# Patient Record
Sex: MALE | Race: WHITE | NOT HISPANIC OR LATINO | Employment: FULL TIME | URBAN - METROPOLITAN AREA
[De-identification: names, ages, dates, MRNs, and addresses within clinical notes are randomized per-mention and may not be internally consistent; named-entity substitution may affect disease eponyms.]

---

## 2017-04-18 ENCOUNTER — OFFICE VISIT (OUTPATIENT)
Dept: GASTROENTEROLOGY | Facility: CLINIC | Age: 52
End: 2017-04-18
Payer: COMMERCIAL

## 2017-04-18 ENCOUNTER — TELEPHONE (OUTPATIENT)
Dept: ENDOSCOPY | Facility: HOSPITAL | Age: 52
End: 2017-04-18

## 2017-04-18 VITALS
WEIGHT: 235 LBS | BODY MASS INDEX: 32.9 KG/M2 | DIASTOLIC BLOOD PRESSURE: 78 MMHG | SYSTOLIC BLOOD PRESSURE: 122 MMHG | HEIGHT: 71 IN | HEART RATE: 65 BPM

## 2017-04-18 DIAGNOSIS — Z80.0 FAMILY HISTORY OF COLON CANCER: Primary | ICD-10-CM

## 2017-04-18 DIAGNOSIS — Z12.11 SCREENING FOR COLON CANCER: ICD-10-CM

## 2017-04-18 DIAGNOSIS — Z12.11 SPECIAL SCREENING FOR MALIGNANT NEOPLASMS, COLON: Primary | ICD-10-CM

## 2017-04-18 PROCEDURE — 1160F RVW MEDS BY RX/DR IN RCRD: CPT | Mod: S$GLB,,, | Performed by: INTERNAL MEDICINE

## 2017-04-18 PROCEDURE — 99999 PR PBB SHADOW E&M-EST. PATIENT-LVL III: CPT | Mod: PBBFAC,,, | Performed by: INTERNAL MEDICINE

## 2017-04-18 PROCEDURE — 99212 OFFICE O/P EST SF 10 MIN: CPT | Mod: S$GLB,,, | Performed by: INTERNAL MEDICINE

## 2017-04-18 RX ORDER — SODIUM, POTASSIUM,MAG SULFATES 17.5-3.13G
1 SOLUTION, RECONSTITUTED, ORAL ORAL ONCE
Qty: 1 BOTTLE | Refills: 0 | Status: SHIPPED | OUTPATIENT
Start: 2017-04-18 | End: 2017-04-18

## 2017-04-26 DIAGNOSIS — Z00.00 ROUTINE GENERAL MEDICAL EXAMINATION AT A HEALTH CARE FACILITY: Primary | ICD-10-CM

## 2017-05-24 ENCOUNTER — ANESTHESIA (OUTPATIENT)
Dept: ENDOSCOPY | Facility: HOSPITAL | Age: 52
End: 2017-05-24
Payer: COMMERCIAL

## 2017-05-24 ENCOUNTER — ANESTHESIA EVENT (OUTPATIENT)
Dept: ENDOSCOPY | Facility: HOSPITAL | Age: 52
End: 2017-05-24
Payer: COMMERCIAL

## 2017-05-24 ENCOUNTER — HOSPITAL ENCOUNTER (OUTPATIENT)
Facility: HOSPITAL | Age: 52
Discharge: HOME | End: 2017-05-24
Attending: INTERNAL MEDICINE | Admitting: INTERNAL MEDICINE
Payer: COMMERCIAL

## 2017-05-24 VITALS
DIASTOLIC BLOOD PRESSURE: 69 MMHG | OXYGEN SATURATION: 95 % | TEMPERATURE: 97 F | BODY MASS INDEX: 33.6 KG/M2 | HEIGHT: 71 IN | SYSTOLIC BLOOD PRESSURE: 121 MMHG | HEART RATE: 67 BPM | RESPIRATION RATE: 16 BRPM | WEIGHT: 240 LBS

## 2017-05-24 VITALS — RESPIRATION RATE: 15 BRPM

## 2017-05-24 DIAGNOSIS — Z12.11 ENCOUNTER FOR SCREENING COLONOSCOPY: ICD-10-CM

## 2017-05-24 PROCEDURE — 37000009 HC ANESTHESIA EA ADD 15 MINS: Performed by: INTERNAL MEDICINE

## 2017-05-24 PROCEDURE — 45380 COLONOSCOPY AND BIOPSY: CPT | Performed by: INTERNAL MEDICINE

## 2017-05-24 PROCEDURE — 88305 TISSUE EXAM BY PATHOLOGIST: CPT | Mod: 26,,, | Performed by: PATHOLOGY

## 2017-05-24 PROCEDURE — 25000003 PHARM REV CODE 250: Performed by: NURSE ANESTHETIST, CERTIFIED REGISTERED

## 2017-05-24 PROCEDURE — 27201089 HC SNARE, DISP (ANY): Performed by: INTERNAL MEDICINE

## 2017-05-24 PROCEDURE — 37000008 HC ANESTHESIA 1ST 15 MINUTES: Performed by: INTERNAL MEDICINE

## 2017-05-24 PROCEDURE — 25000003 PHARM REV CODE 250: Performed by: INTERNAL MEDICINE

## 2017-05-24 PROCEDURE — 88305 TISSUE EXAM BY PATHOLOGIST: CPT | Performed by: PATHOLOGY

## 2017-05-24 PROCEDURE — 45380 COLONOSCOPY AND BIOPSY: CPT | Mod: 59,,, | Performed by: INTERNAL MEDICINE

## 2017-05-24 PROCEDURE — D9220A PRA ANESTHESIA: Mod: PT,ANES,, | Performed by: ANESTHESIOLOGY

## 2017-05-24 PROCEDURE — 27201012 HC FORCEPS, HOT/COLD, DISP: Performed by: INTERNAL MEDICINE

## 2017-05-24 PROCEDURE — 63600175 PHARM REV CODE 636 W HCPCS: Performed by: NURSE ANESTHETIST, CERTIFIED REGISTERED

## 2017-05-24 PROCEDURE — 45385 COLONOSCOPY W/LESION REMOVAL: CPT | Mod: 33,,, | Performed by: INTERNAL MEDICINE

## 2017-05-24 PROCEDURE — D9220A PRA ANESTHESIA: Mod: PT,CRNA,, | Performed by: NURSE ANESTHETIST, CERTIFIED REGISTERED

## 2017-05-24 PROCEDURE — 45385 COLONOSCOPY W/LESION REMOVAL: CPT | Performed by: INTERNAL MEDICINE

## 2017-05-24 RX ORDER — PROPOFOL 10 MG/ML
VIAL (ML) INTRAVENOUS CONTINUOUS PRN
Status: DISCONTINUED | OUTPATIENT
Start: 2017-05-24 | End: 2017-05-24

## 2017-05-24 RX ORDER — PROPOFOL 10 MG/ML
VIAL (ML) INTRAVENOUS
Status: DISCONTINUED | OUTPATIENT
Start: 2017-05-24 | End: 2017-05-24

## 2017-05-24 RX ORDER — SODIUM CHLORIDE 9 MG/ML
INJECTION, SOLUTION INTRAVENOUS CONTINUOUS
Status: DISCONTINUED | OUTPATIENT
Start: 2017-05-24 | End: 2017-05-24 | Stop reason: HOSPADM

## 2017-05-24 RX ORDER — LIDOCAINE HCL/PF 100 MG/5ML
SYRINGE (ML) INTRAVENOUS
Status: DISCONTINUED | OUTPATIENT
Start: 2017-05-24 | End: 2017-05-24

## 2017-05-24 RX ADMIN — LIDOCAINE HYDROCHLORIDE 100 MG: 20 INJECTION, SOLUTION INTRAVENOUS at 08:05

## 2017-05-24 RX ADMIN — PROPOFOL 200 MCG/KG/MIN: 10 INJECTION, EMULSION INTRAVENOUS at 08:05

## 2017-05-24 RX ADMIN — PROPOFOL 80 MG: 10 INJECTION, EMULSION INTRAVENOUS at 08:05

## 2017-05-24 RX ADMIN — SODIUM CHLORIDE: 0.9 INJECTION, SOLUTION INTRAVENOUS at 08:05

## 2017-05-24 NOTE — PATIENT INSTRUCTIONS
Discharge Summary/Instructions for after Colonoscopy with   Biopsy/Polypectomy  Patient Name: Porter Rice  Patient MRN: [id number]  Patient YOB: 1965  Wednesday, May 24, 2017  Adan Scott MD  RESTRICTIONS ON ACTIVITY:  - Do not drive a car or operate machinery until the day after the procedure.      - The following day: return to full activity including work.  - For  3 days: No heavy lifting, straining or running.  - Diet: Eat and drink normally unless instructed otherwise.  TREATMENT FOR COMMON SIDE EFFECTS:  - Mild abdominal pain and bloating or excessive gas: rest, eat lightly and   use a heating pad.  SYMPTOMS TO WATCH FOR AND REPORT TO YOUR PHYSICIAN:  1. Severe abdominal pain.  2. Fever within 24 hours after a procedure.  3. A large amount of rectal bleeding. (A small amount of blood from the   rectum is not serious, especially if hemorrhoids are present.  4. Because air was put into your colon during the procedure, expelling large   amounts of air from your rectum is normal.  5. You may not have a bowel movement for 1-3 days because of the colonoscopy   prep.  This is normal.  6. Go directly to the emergency room if you notice any of the following:   Chills and/or fever over 101   Persistent vomiting   Severe abdominal pain, other than gas cramps   Severe chest pain   Black, tarry stools   Any bleeding - exceeding one tablespoon  Your doctor recommends these additional instructions:  If any biopsies were performed, my office will call you in 5 to 6 business   days with any results.  You are being discharged to home.   We are waiting for your pathology results.   Telephone your endoscopist for pathology results in two weeks.   Your physician has recommended a repeat colonoscopy in three years for   surveillance, for surveillance based on pathology results and for   surveillance of multiple polyps.   For next 2 weeks only - Consider avoiding all non-steroidal   anti-inflammatory drugs  (aspirin, ibuprofen, naproxen, etc.), unless needed   for cardiovascular protection.  Recommend you discuss with your prescribing   doctor (of your aspirin) to see if cardiovascular benefits of your aspirin   outweigh the risks of GI bleeding.  The findings and recommendations have been discussed with you.   Return to your GI clinic at the next available appointment.  If you have any questions or problems, please call your physician - Adan Scott MD at Work:  [phone].    LAB RESULTS: [phone]  [facility], EMERGENCY ROOM PHONE NUMBER: [phone]  IF A COMPLICATION OR EMERGENCY SITUATION ARISES AND YOU ARE UNABLE TO REACH   YOUR PHYSICIAN - GO TO THE EMERGENCY ROOM.  Adan Scott MD  5/24/2017 8:47:07 AM  This report has been verified and signed electronically.

## 2017-05-24 NOTE — DISCHARGE INSTRUCTIONS
Colorectal Cancer Screening    Colorectal cancer (cancer in the colon or rectum) is a leading cause of cancer deaths in the [place] But it doesnt have to be. When this cancer is found and removed early, the chances of a full recovery are very good. Because colorectal cancer rarely causes symptoms in its early stages, screening for the disease is important. Its even more crucial if you have risk factors for the disease. Learn more about colorectal cancer and its risk factors. Then talk to your healthcare provider about being screened. You could be saving your own life.  Risk factors for colorectal cancer  Your risk of having colorectal cancer increases if you:  · Are 50 years of age or older  · Have a family history or personal history of colorectal cancer or polyps  · Have a personal history of type 2 diabetes, Crohns disease, or ulcerative colitis  · Have an inherited genetic syndrome like Santana syndrome (also known as HNPCC) or familial adenomatous polyposis (FAP)  · Are very overweight  · Are not physically active  · Smoke  · Drink a lot of alcohol  · Eat a lot of red or processed meat  The colon and rectum  Waste from food you eat enters the colon from the small intestine. As it travels through the colon, the waste (stool) loses water and becomes more solid. Intestinal muscles push it toward the sigmoid--the last section of the colon. Stool then moves into the rectum, where its stored until its ready to leave the body during a bowel movement.  How cancer develops  Polyps are growths that form on the inner lining of the colon or rectum. Most are benign, which means they arent cancerous. But over time, some polyps can become cancer (malignant). This happens when cells in these polyps begin growing abnormally. In time, malignant cells invade more and more of the colon and rectum. The cancer may also spread to nearby organs or lymph nodes or to other parts of the body. Finding and removing polyps can help  prevent cancer from ever forming.  Your screening  Screening means looking for a health problem before you have symptoms. During screening for colorectal cancer, your healthcare provider will ask about your health history, examine you, and do one or more tests.  History and exam  The history and exam involve the following:  · Health history. Your healthcare provider will ask about your health history. Mention if a family member has had colon cancer or polyps. Also mention any health problems you have had in the past.  · Digital rectal exam (ASHWIN). During a ASHWIN, the healthcare provider inserts a lubricated gloved finger into the rectum. The test is painless and takes less than a minute. Healthcare providers agree that this test alone is not enough to screen for colorectal cancer.  Screening test choices  Fecal occult blood test (FOBT) or fecal immunochemical test (FIT)  These tests check for occult blood in stool (blood you cant see). Hidden blood may be a sign of colon polyps or cancer. A small sample of stool is tested for blood in a laboratory. Most often, you collect this sample at home using a kit your healthcare provider gives you. Follow the instructions carefully for using this kit. You might need to avoid certain foods and medicines before the test, as directed.  Barium enema with contrast (double-contrast barium enema)  This test uses X-rays to provide images of the entire colon and rectum. The day before this test, you will need to do a bowel prep to clean out the colon and rectum. A bowel prep is a liquid diet plus strong laxatives or enemas. You will be awake for the test, but you may be given medicine to help you relax. At the start of the test, a radiologist (a healthcare provider who specializes in imaging tests) places a soft tube into the rectum. The tube is used to fill the colon with a contrast liquid (barium) and air. This can be uncomfortable for some people. The liquid helps the colon show up  clearly on the X-rays. Because the test uses X-rays, it exposes you to a small amount of radiation.  Virtual colonoscopy  This exam is also called a CT colonography. It uses a series of X-ray photographs to create a 3-D view of the colon and rectum. The day before the test, you will need to do a bowel prep to clean out your colon. Your healthcare provider will give you instructions on how to do this. During the procedure, you will lie on a table that is part of a special X-ray machine called a CT scanner. A small tube will be placed into your rectum to fill the colon and rectum with air. This can be uncomfortable for some people. Then, the table will move into the machine and pictures will be taken of your colon and rectum. A computer will combine these photos to create a 3-D picture. Because the test uses X-rays, it exposes you to a small amount of radiation.  Scope exams  Here are two types of scope exams:  · Colonoscopy. This test can be used to find and remove polyps anywhere in the colon or rectum. The day before the test, you will do a bowel prep. This is a liquid diet plus a strong laxative solution or an enema. The bowel prep will cleanse your colon. You will be given instructions for this. Just before the test, you are given a medicine to make you sleepy. Then, a long, flexible, lighted tube called a colonoscope is gently inserted into the rectum and guided through the entire colon. Images of the colon are viewed on a video screen. Any polyps that are found are removed and sent to a lab for testing. If a polyp cant be removed, a sample of tissue is taken and the polyp might be removed later during surgery. You will need to bring someone with you to drive you home after this test.   · Sigmoidoscopy. This test is similar to colonoscopy, but focuses only on the sigmoid colon and rectum. As with colonoscopy, bowel prep must be done the day before this test. It might not need to be as complete as the bowel prep  for a colonoscopy. You are awake during the procedure, but you may be given medicine to help you relax. During the test, the healthcare provider guides a thin, flexible, lighted tube called a sigmoidoscope through your rectum and lower colon. The images are displayed on a video screen. Polyps are removed, if possible, and sent to a lab for testing.  Colonoscopy is the only screening test that lets your healthcare provider see the entire colon and rectum. This test also lets your healthcare provider remove any pieces of tissue that need to be looked at by a lab. If something suspicious is found using any other tests, you will likely need a colonoscopy.     When to call your healthcare provider after a test  Call your healthcare provider if you have any of the following after any screening test:  · Bleeding  · Fever of 100.4°F (38°C) or higher, or as directed by your healthcare provider  · Abdominal pain  · Vomiting   Date Last Reviewed: 11/4/2015  © 7301-7281 [employer]. [address] [place] [address], [place] [address]. All rights reserved. This information is not intended as a substitute for professional medical care. Always follow your healthcare professional's instructions.

## 2017-05-24 NOTE — ANESTHESIA POSTPROCEDURE EVALUATION
"Anesthesia Post Evaluation    Patient: Porter Rice    Procedure(s) Performed: Procedure(s) (LRB):  COLONOSCOPY (N/A)    Final Anesthesia Type: general  Patient location during evaluation: PACU  Patient participation: Yes- Able to Participate  Level of consciousness: awake and alert  Post-procedure vital signs: reviewed and stable  Pain management: adequate  Airway patency: patent  PONV status at discharge: No PONV  Anesthetic complications: no      Cardiovascular status: stable  Respiratory status: unassisted, spontaneous ventilation and room air  Hydration status: euvolemic  Follow-up not needed.        Visit Vitals  /69 (BP Location: Left arm, Patient Position: Lying, BP Method: Automatic)   Pulse 67   Temp 36.3 °C (97.4 °F) (Oral)   Resp 16   Ht 5' 11" (1.803 m)   Wt 108.9 kg (240 lb)   SpO2 95%   BMI 33.47 kg/m²       Pain/Brad Score: Pain Assessment Performed: Yes (5/24/2017  9:02 AM)  Presence of Pain: denies (5/24/2017  9:02 AM)  Brad Score: 10 (5/24/2017  9:02 AM)      "

## 2017-05-24 NOTE — H&P
[facility]  History & Physical    Subjective:      Chief Complaint/Reason for Admission:     colonoscopy    Porter Rice is a 52 y.o. male.    History reviewed. No pertinent past medical history.  History reviewed. No pertinent surgical history.  Family History   Problem Relation Age of Onset    Hypertension Mother     Cancer Father 82     colon cancer    Colon cancer Father 82    Diabetes Maternal Aunt     Celiac disease Neg Hx     Cirrhosis Neg Hx     Crohn's disease Neg Hx     Esophageal cancer Neg Hx     Inflammatory bowel disease Neg Hx     Liver cancer Neg Hx     Rectal cancer Neg Hx     Stomach cancer Neg Hx     Ulcerative colitis Neg Hx      Social History   Substance Use Topics    Smoking status: Former Smoker     Packs/day: 1.00     Years: 29.00     Types: Cigarettes     Quit date: 8/3/2010    Smokeless tobacco: Never Used    Alcohol use 0.0 oz/week       No prescriptions prior to admission.     Review of patient's allergies indicates:  No Known Allergies     Review of Systems   Constitutional: Negative for chills, fever and weight loss.   Respiratory: Negative for shortness of breath and wheezing.    Cardiovascular: Negative for chest pain.   Gastrointestinal: Negative for abdominal pain, blood in stool, constipation, diarrhea and melena.       Objective:      Vital Signs (Most Recent)  Temp: 98.3 °F (36.8 °C) (05/24/17 0756)  Pulse: 65 (05/24/17 0756)  Resp: 18 (05/24/17 0756)  BP: 130/87 (05/24/17 0756)  SpO2: 96 % (05/24/17 0756)    Vital Signs Range (Last 24H):  Temp:  [98.3 °F (36.8 °C)]   Pulse:  [65]   Resp:  [18-49]   BP: (130)/(87)   SpO2:  [96 %]     Physical Exam   Constitutional: He is oriented to person, place, and time. He appears well-developed and well-nourished.   Pulmonary/Chest: Effort normal.   Abdominal: Soft. Bowel sounds are normal. He exhibits no distension. There is no tenderness.   Neurological: He is oriented to person, place, and time.    Skin: Skin is warm and dry.   Psychiatric: He has a normal mood and affect. His behavior is normal. Judgment and thought content normal.           Assessment:      Active Hospital Problems    Diagnosis  POA    Encounter for screening colonoscopy [Z12.11]  Not Applicable      Resolved Hospital Problems    Diagnosis Date Resolved POA   No resolved problems to display.       Plan:    Colonoscopy for screening.

## 2017-05-24 NOTE — ANESTHESIA PREPROCEDURE EVALUATION
05/24/2017  Porter Rice is a 52 y.o., male.  Pre-operative evaluation for Procedure(s) (LRB):  COLONOSCOPY (N/A)    Review of patient's allergies indicates:  No Known Allergies    No current facility-administered medications on file prior to encounter.      No current outpatient prescriptions on file prior to encounter.       There is no problem list on file for this patient.      No past surgical history on file.        No results for input(s): HCT in the last 72 hours.  No results for input(s): PLT in the last 72 hours.  No results for input(s): K in the last 72 hours.  No results for input(s): CREATININE in the last 72 hours.  No results for input(s): GLU in the last 72 hours.  Invalid input(s): PT                    Anesthesia Evaluation         Review of Systems  Anesthesia Hx:  No problems with previous Anesthesia    Social:  Non-Smoker, Alcohol Use    Hematology/Oncology:  Hematology Normal   Oncology Normal     Cardiovascular:   Denies Hypertension.  Denies MI.    Denies Angina.    Pulmonary:  Pulmonary Normal  Denies COPD.  Denies Asthma.  Denies Shortness of breath.    Renal/:   Denies Chronic Renal Disease.     Hepatic/GI:   Denies Liver Disease.    Neurological:   Denies TIA. Denies CVA. Denies Seizures.    Endocrine:   Denies Diabetes.        Physical Exam  General:  Well nourished    Airway/Jaw/Neck:  Airway Findings: Mouth Opening: Normal Tongue: Normal  General Airway Assessment: Adult, Average  Mallampati: II  TM Distance: Normal, at least 6 cm  Jaw/Neck Findings:  Neck ROM: Normal ROM        Heart/Vascular:  Heart Findings: Rate: Normal  Rhythm: Regular Rhythm        Mental Status:  Mental Status Findings:  Cooperative, Alert and Oriented         Anesthesia Plan  Type of Anesthesia, risks & benefits discussed:  Anesthesia Type:  general  Patient's Preference:   Intra-op Monitoring  Plan:   Intra-op Monitoring Plan Comments:   Post Op Pain Control Plan:   Post Op Pain Control Plan Comments: As per surgeon's plan  Induction:   IV  Beta Blocker:  Patient is not currently on a Beta-Blocker (No further documentation required).       Informed Consent: Patient understands risks and agrees with Anesthesia plan.  Questions answered. Anesthesia consent signed with patient.  ASA Score: 2     Day of Surgery Review of History & Physical:    H&P update referred to the surgeon.         Ready For Surgery From Anesthesia Perspective.

## 2017-05-24 NOTE — ANESTHESIA RELEASE NOTE
"Anesthesia Release from PACU Note    Patient: Porter Rice    Procedure(s) Performed: Procedure(s) (LRB):  COLONOSCOPY (N/A)    Anesthesia type: general    Post pain: Adequate analgesia    Post assessment: no apparent anesthetic complications    Last Vitals:   Visit Vitals  /69 (BP Location: Left arm, Patient Position: Lying, BP Method: Automatic)   Pulse 67   Temp 36.3 °C (97.4 °F) (Oral)   Resp 16   Ht 5' 11" (1.803 m)   Wt 108.9 kg (240 lb)   SpO2 95%   BMI 33.47 kg/m²       Post vital signs: stable    Level of consciousness: awake    Nausea/Vomiting: no nausea/no vomiting    Complications: none    Airway Patency: patent    Respiratory: unassisted    Cardiovascular: stable and blood pressure at baseline    Hydration: euvolemic  "

## 2017-05-24 NOTE — TRANSFER OF CARE
"Anesthesia Transfer of Care Note    Patient: Porter Rice    Procedure(s) Performed: Procedure(s) (LRB):  COLONOSCOPY (N/A)    Patient location: PACU    Anesthesia Type: general    Transport from OR: Transported from OR on room air with adequate spontaneous ventilation    Post pain: adequate analgesia    Post assessment: no apparent anesthetic complications    Post vital signs: stable    Level of consciousness: awake and alert    Nausea/Vomiting: no nausea/vomiting    Complications: none    Transfer of care protocol was followed      Last vitals:   Visit Vitals  /87 (BP Location: Left arm, Patient Position: Sitting, BP Method: Automatic)   Pulse 65   Temp 36.8 °C (98.3 °F) (Oral)   Resp 18   Ht 5' 11" (1.803 m)   Wt 108.9 kg (240 lb)   SpO2 96%   BMI 33.47 kg/m²     "

## 2017-05-31 ENCOUNTER — TELEPHONE (OUTPATIENT)
Dept: ENDOSCOPY | Facility: HOSPITAL | Age: 52
End: 2017-05-31

## 2017-06-02 ENCOUNTER — TELEPHONE (OUTPATIENT)
Dept: GASTROENTEROLOGY | Facility: CLINIC | Age: 52
End: 2017-06-02

## 2017-06-02 NOTE — TELEPHONE ENCOUNTER
----- Message from Adan Scott MD sent at 5/31/2017  5:01 PM CDT -----  VERY IMPORTANT:    Joslyn - please tell Porter that his colon polyps were benign but 4 were precancerous adenomas and the large one was and advanced colon adenomas.    Recommend he come to GI clinic to discuss.    Next surveillance colonoscopy for him in 3-years.    All his first degree relatives should get their first screening colonoscopy at age 40 and every 5 years based on your advanced colon advenoma that you had prior to you turning 60.    SPECIMEN  1) Cecal, 1 mm polyp.  2) Ascending colon, 1 mm polyp.  3) Descending colon, 10 mm polyp.  4) Sigmoid, 1 mm polyp.  5) Rectal, 1 mm polyp.      FINAL PATHOLOGIC DIAGNOSIS  BIOPSIES OF 1. CECUM, 2. ASCENDING COLON, AND 5. RECTUM:  ADENOMATOUS POLYPS    3. BIOPSY OF DESCENDING COLON:  MIXED VILLOUS AND ADENOMATOUS POLYP    4. BIOPSY OF SIGMOID:  BENIGN NONNEOPLASTIC COLONIC MUCOSA WITH NO SIGNIFICANT HISTOLOGIC ALTERATION    Diagnosed by: Oseas Esteban M.D.

## 2017-06-05 ENCOUNTER — HOSPITAL ENCOUNTER (OUTPATIENT)
Dept: CARDIOLOGY | Facility: CLINIC | Age: 52
Discharge: HOME | End: 2017-06-05

## 2017-06-05 ENCOUNTER — CLINICAL SUPPORT (OUTPATIENT)
Dept: INTERNAL MEDICINE | Facility: CLINIC | Age: 52
End: 2017-06-05

## 2017-06-05 ENCOUNTER — OFFICE VISIT (OUTPATIENT)
Dept: PULMONOLOGY | Facility: CLINIC | Age: 52
End: 2017-06-05

## 2017-06-05 DIAGNOSIS — Z00.00 ROUTINE GENERAL MEDICAL EXAMINATION AT A HEALTH CARE FACILITY: Primary | ICD-10-CM

## 2017-06-05 DIAGNOSIS — Z00.00 ANNUAL PHYSICAL EXAM: Primary | ICD-10-CM

## 2017-06-05 DIAGNOSIS — Z00.00 ROUTINE GENERAL MEDICAL EXAMINATION AT A HEALTH CARE FACILITY: ICD-10-CM

## 2017-06-05 LAB
ALBUMIN SERPL BCP-MCNC: 3.9 G/DL
ALP SERPL-CCNC: 68 U/L
ALT SERPL W/O P-5'-P-CCNC: 39 U/L
ANION GAP SERPL CALC-SCNC: 8 MMOL/L
AST SERPL-CCNC: 27 U/L
BILIRUB SERPL-MCNC: 0.6 MG/DL
BUN SERPL-MCNC: 16 MG/DL
CALCIUM SERPL-MCNC: 9.8 MG/DL
CHLORIDE SERPL-SCNC: 106 MMOL/L
CHOLEST/HDLC SERPL: 4.8 {RATIO}
CO2 SERPL-SCNC: 27 MMOL/L
COMPLEXED PSA SERPL-MCNC: 0.24 NG/ML
CREAT SERPL-MCNC: 1.1 MG/DL
ERYTHROCYTE [DISTWIDTH] IN BLOOD BY AUTOMATED COUNT: 12.6 %
EST. GFR  (AFRICAN AMERICAN): >60 ML/MIN/1.73 M^2
EST. GFR  (NON AFRICAN AMERICAN): >60 ML/MIN/1.73 M^2
ESTIMATED AVG GLUCOSE: 140 MG/DL
GLUCOSE SERPL-MCNC: 122 MG/DL
HBA1C MFR BLD HPLC: 6.5 %
HCT VFR BLD AUTO: 46.7 %
HDL/CHOLESTEROL RATIO: 21 %
HDLC SERPL-MCNC: 233 MG/DL
HDLC SERPL-MCNC: 49 MG/DL
HGB BLD-MCNC: 15.8 G/DL
LDLC SERPL CALC-MCNC: 153.6 MG/DL
MCH RBC QN AUTO: 30.6 PG
MCHC RBC AUTO-ENTMCNC: 33.8 %
MCV RBC AUTO: 90 FL
NONHDLC SERPL-MCNC: 184 MG/DL
PLATELET # BLD AUTO: 188 K/UL
PMV BLD AUTO: 11 FL
POTASSIUM SERPL-SCNC: 4.6 MMOL/L
PROT SERPL-MCNC: 7.2 G/DL
RBC # BLD AUTO: 5.17 M/UL
SODIUM SERPL-SCNC: 141 MMOL/L
TRIGL SERPL-MCNC: 152 MG/DL
TSH SERPL DL<=0.005 MIU/L-ACNC: 1.53 UIU/ML
WBC # BLD AUTO: 6.57 K/UL

## 2017-06-05 PROCEDURE — 97802 MEDICAL NUTRITION INDIV IN: CPT | Mod: S$GLB,,, | Performed by: INTERNAL MEDICINE

## 2017-06-05 PROCEDURE — 99386 PREV VISIT NEW AGE 40-64: CPT | Mod: ,,, | Performed by: INTERNAL MEDICINE

## 2017-06-05 PROCEDURE — 84443 ASSAY THYROID STIM HORMONE: CPT

## 2017-06-05 PROCEDURE — 80053 COMPREHEN METABOLIC PANEL: CPT

## 2017-06-05 PROCEDURE — 99999 PR PBB SHADOW E&M-EST. PATIENT-LVL II: CPT | Mod: PBBFAC,,, | Performed by: INTERNAL MEDICINE

## 2017-06-05 PROCEDURE — 83036 HEMOGLOBIN GLYCOSYLATED A1C: CPT

## 2017-06-05 PROCEDURE — 80061 LIPID PANEL: CPT

## 2017-06-05 PROCEDURE — 84153 ASSAY OF PSA TOTAL: CPT

## 2017-06-05 PROCEDURE — 93000 ELECTROCARDIOGRAM COMPLETE: CPT | Mod: ,,, | Performed by: INTERNAL MEDICINE

## 2017-06-05 PROCEDURE — 97750 PHYSICAL PERFORMANCE TEST: CPT | Mod: S$GLB,,, | Performed by: INTERNAL MEDICINE

## 2017-06-05 PROCEDURE — 85027 COMPLETE CBC AUTOMATED: CPT

## 2017-06-05 NOTE — PROGRESS NOTES
"Nutrition Assessment  Client name:  Porter Rice  :  1965  Age:  52 y.o.  Gender:  male    Client states:  Very pleasant spouse of a [employer] employee here for his annual [employer] physical.  Denies changes in PMH over the past year, noting that he does not take prescription medications or supplements.  Is  with three children and three grandchildren, ages 3, 4, and 8.  Joined [employer] earlier this year and began exercising with his wife 2-3x/week, including the treadmill and various upper body equipment.  However, has not been in two weeks due to time constraints at work.  Has been training fellow colleagues and so, has been eating out for lunch more frequently than usual.  Had been packing his lunches prior to such, consisting of a deli meat sandwich on wheat and vegetable chips.  Recently, switched to whole wheat bread and brown rice, stating that he tries to select healthier varieties of carbohydrates when available.  Has a strong family history of DM, noting that although he drinks fruit juice in the mornings, he is willing to reduce/eliminate it if necessary.  Overall, was very receptive to nutrition education and recommendations received.            No past medical history on file.    Social History    Marital status:    Employment:  [employer]  Social History   Substance Use Topics    Smoking status: Former Smoker     Packs/day: 1.00     Years: 29.00     Types: Cigarettes     Quit date: 8/3/2010    Smokeless tobacco: Never Used    Alcohol use 0.0 oz/week        Current medications:  currently has no medications in their medication list.  Vitamins, minerals, and/or supplements:  None   Food allergies or intolerances:  NKFA     Food History  Breakfast:  [employer] Go Lean cereal + 2% milk + 6 oz O.J. + coffee/wheat bagel + 1 egg + cheddar cheese  Lunch:  "Out lately"/sandwich on wheat + veggie chips  Dinner:  "Meat + protein + healthy starch"  H.S. Snack:  Ice cream    Exercise " "History:  30 minutes treadmill + 30 minutes upper body equipment 2-3x/week (However, has not exercised for the past 2 weeks.)    Lab Reports   Total Cholesterol:  233    Triglycerides:  152  HDL:  49  LDL:  153.6   Glucose:  122  HbA1c:  6.5%  BP:  140/86 (RBP measuring during fitness consult)     Weight History  Height:  5' 10.5"     Weight:  233#  BMI:  33  % Body Fat:  29.89%    Diagnosis  RMR (Method:  Body Beaver):  1820 kcal  Activity Factor:  1.3  ASHISH:  2366 - 500 = 1866 kcal    Altered nutrition-related laboratory values related to improper food choices and inadequate physical activity as evidenced by TC:  233; TGL:  152; LDL:  153.6; glucose:  122; HbA1c:  6.5%.    Intervention    Goals:  1.  HbA1c < 6%  2.  Normalize lipid panel  3.  Achieve 10% weight loss  4.  Increase exercise to 60 minutes 3x/week as tolerated  5.  Switch to low fat cheese  6.  Reduce fruit juice intake to 3 oz/day or less, diluting with water  7.  Limit CHO intake to ¼ plate with meals    Reviewed past labs, including CMP, lipid panel, and HbA1c.  Praised patient for dietary modifications and exercise initiation.  Improvements in lipid panel, HbA1c, and weight (9# wt loss since 2015) noted although labs remains borderline high.  Discussed prediabetes in great detail, including reference values for HbA1c and fasting glucose, food sources of CHO, recommended daily intake for CHO, benefits of cardiovascular exercise and weight loss, and dietary strategies to improve values.  Advised patient to reduce fruit juice intake, diluting with water, in addition to switching to reduced fat cheeses.  Stressed the overall importance of healthy lifestyle changes via proper nutrition and physical activity so as to promote gradual weight loss, improved heart health, reduced risk of DM, and overall risk factor management.    Handouts provided:  Meal Planning Guide  Restaurant Guide  Eat Fit Shopping List  Eat Fit Sandie  Fast Food Guide  Vitamin/Mineral " Guide  Carbohydrate Counting for People with DM    Monitoring/Evaluation    Monitor the following:  Weight  BMI  % Body Fat  Caloric intake  Lipid panel  Blood pressure  Glucose  HbA1c    Follow Up Plan:  Follow up with client in 1-2 years

## 2017-06-05 NOTE — LETTER
June 5, 2017    Porter Rice  [address]  [place] [address]             [facility]  [address]  [place] [address]  Phone: [phone] Dear Mr. Rice:    Thank you for allowing me to serve you and perform your [employer] exam on 6/5/2017. This letter will serve as a brief summary of the physical findings and laboratory/studies performed and recommendations at this time. AT this visit you have several minor abnormalities of blood sugar and lipids, all correctable  With weight loss. I showed you My Fitness Pal esther and that should help you lose weight.          If you have any questions or concerns, please don't hesitate to call.    Sincerely,        Eliel Wills MD

## 2017-06-06 NOTE — PROGRESS NOTES
Subjective:       Patient ID: Porter Rice is a 52 y.o. male.    Chief Complaint: No chief complaint on file.    HPI   Mr. Rice has reported no previous history of cardiovascular or pulmonary disease. He has reported no physical limitations to exercise. Mr. Rice is beginning exercise again today. He has set a goal weight of 200 pounds by next year.     Current Exercise Routine:   - None    Review of Systems    Objective:      The fitness evaluation results are as follows:    D.O.S. 6/5/2017 6/13/2016 8/3/2015   Height (in): 70.5 70.5 70.5   Weight (lbs): 233 235 242   BMI: 33.0284 33.000642 34.918752   Body Fat (%): 29.89 29.70 29.10   Waist (cm): 109 111 113   Hip (cm): 114 113 112.5   WHR: 0.96 0.98 1.00   RBP (mmHg): 140/86 124/84 125/85   RHR (bpm): 68 64 70    Strength R (lbs)t: 113.3333 0 109.28210    Strength Lt (lbs): 102 0 106.55002   Push-up Assessment: 23 20 16   Curl-up Assessment: 75 30 75   Flexibility Testing (cm): 0 <9 0   REE (kcals): 1820 1950 2000     Physical Exam    Assessment:      Age/Gender Stratified Assessment:    Resting BP: Within Testing Limits   Body Fat %: Poor   WHR Risk Factor: Moderate Risk    Strength R: Average    Strength L: Average   Upper Body Endurance: Excellent   Abdominal Endurance: Well Above Average   Lower body Flexibility: Needs Improvement     1. Routine general medical examination at a health care facility        Plan:    Mr. Rice should begin a regular balanced exercise routine striving to reach 150 minutes of moderate intensity aerobic activity each week. He should also strive to perform resistance training 2-4 days per week in order to maintain his current muscular strength and endurance. Daily stretching should be the focus for Mr. Rice in order to increase level of flexibility and balance.

## 2017-07-26 ENCOUNTER — OFFICE VISIT (OUTPATIENT)
Dept: GASTROENTEROLOGY | Facility: CLINIC | Age: 52
End: 2017-07-26
Payer: COMMERCIAL

## 2017-07-26 VITALS
BODY MASS INDEX: 33.88 KG/M2 | HEIGHT: 71 IN | SYSTOLIC BLOOD PRESSURE: 126 MMHG | HEART RATE: 66 BPM | DIASTOLIC BLOOD PRESSURE: 90 MMHG | WEIGHT: 242 LBS

## 2017-07-26 DIAGNOSIS — Z80.0 FAMILY HISTORY OF COLON CANCER IN FATHER: ICD-10-CM

## 2017-07-26 DIAGNOSIS — D37.4 VILLOUS ADENOMA OF COLON: Primary | ICD-10-CM

## 2017-07-26 PROCEDURE — 99213 OFFICE O/P EST LOW 20 MIN: CPT | Mod: S$GLB,,, | Performed by: INTERNAL MEDICINE

## 2017-07-26 PROCEDURE — 99999 PR PBB SHADOW E&M-EST. PATIENT-LVL III: CPT | Mod: PBBFAC,,, | Performed by: INTERNAL MEDICINE

## 2017-07-26 NOTE — PROGRESS NOTES
CHIEF COMPLAINT:  Advanced colon adenomatous polyps.    HISTORY OF PRESENT ILLNESS:  This is a 52-year-old white male whose father had a   colorectal cancer around the age of 82.  No other family members with   colorectal cancer.  No other GI malignancies.  The patient underwent a screening   colonoscopy on 05/24/2017.  It was a complete colonoscopy all the way to the   terminal ileum, excellent bowel prep.  He was found to have five colon polyps,   one was 10 mm and was villous adenoma.  He also has some other adenomas, a total   of 4 colon adenomatous polyps.  It is recommended that he have his next   colonoscopy in three years from his last one and then every five years.  It is   also recommended that his three children start getting their first colonoscopy   at age 40 and every five years as well as his siblings at age 40 and every five   years based on his advanced adenomatous polyp before the age of him turning 60.    Otherwise, the patient is doing well.  No problems after his colonoscopy.    REVIEW OF SYSTEMS:  CONSTITUTIONAL:  No fever, fatigue or weight loss.  ENT:  No difficulty swallowing or sore throat.  CARDIOVASCULAR:  No chest pain or palpitations.  RESPIRATORY:  No shortness of breath or cough.  GENITOURINARY:  No dysuria, urgency or frequency.  MUSCULOSKELETAL:  No arthritis.  SKIN:  No itching or rash.  NEUROLOGIC:  No headache, syncope or stroke.  PSYCHIATRIC:  No uncontrolled depression or anxiety.  LYMPHATICS:  No lymphadenopathy.  ALLERGIES:  No known drug allergies.  GASTROINTESTINAL:  No nausea or vomiting, no heartburn, no abdominal pain, no   diarrhea, no constipation, no blood in his stool.    PAST SURGICAL HISTORY:  Abdominal surgery.    FAMILY HISTORY:  Dad with colon cancer at age 82.  No other first or   second-degree relatives with colorectal cancer.  Nobody with FAP, attenuated   FAP, MAP or Santana syndrome.  Nobody with celiac sprue or inflammatory bowel   disease.    SOCIAL  HISTORY:  Nonsmoker, quit in August 2010.  Drinks alcohol socially.    Works for [employer].  He has been there for about 33 years.  He has been    for about 13 years.  His wife works for [employer].  She has been doing that   for 35 years.  She works in purchasing.    PHYSICAL EXAMINATION:  VITAL SIGNS:  With chaperone in the room, blood pressure is 126/90, pulse 66 and   regular, 5 feet 11 inches tall, 242 pounds, BMI is 33.75.  He is alert and   oriented x4, not in any acute distress.  ABDOMEN:  Obese, but soft, no guarding, no rebound.  No tenderness.  No palpable   organomegaly.  No bruits.  No pulsatile masses.  No stigmata of chronic liver   disease.  No appreciative ascites or hernias.  Normoactive bowel sounds.  No CVA   tenderness.  No Puentes sign.  CARDIOVASCULAR:  S1 and S2 without murmurs, gallops or rubs.  RESPIRATORY:  Clear to auscultation bilaterally without wheezes, rhonchi or   rales.  SKIN:  No petechiae or rash on exposed skin areas.  NEUROLOGIC:  Alert and oriented x4.  PSYCHIATRIC:  Normal speech, mentation and affect.  LYMPHATICS:  No cervical or supraclavicular lymphadenopathy.  No appreciative   thyromegaly.    MEDICAL DECISION MAKING:  As above, advanced colon adenomatous polyp talk given.    IMPRESSION AND PLAN:  History of advanced colon adenomatous polyp.  Recommend   his next colonoscopy in three years from his last, which should be in May 2020   and then every five years if normal.  Also, all his first-degree relatives, his   siblings and his children should get their first colonoscopy at age 40 and every   five years based on his advanced adenomatous polyp.  [facility]      SEC/HN  dd: 07/26/2017 16:16:49 (CDT)  td: 07/27/2017 01:43:18 (CDT)  Doc ID   #[id number]  Job ID #[id number]    CC:

## 2018-05-21 ENCOUNTER — OFFICE VISIT (OUTPATIENT)
Dept: INTERNAL MEDICINE | Facility: CLINIC | Age: 53
End: 2018-05-21

## 2018-05-21 ENCOUNTER — CLINICAL SUPPORT (OUTPATIENT)
Dept: INTERNAL MEDICINE | Facility: CLINIC | Age: 53
End: 2018-05-21

## 2018-05-21 VITALS
HEART RATE: 66 BPM | SYSTOLIC BLOOD PRESSURE: 120 MMHG | DIASTOLIC BLOOD PRESSURE: 82 MMHG | OXYGEN SATURATION: 96 % | BODY MASS INDEX: 33.88 KG/M2 | HEIGHT: 71 IN | WEIGHT: 242 LBS

## 2018-05-21 DIAGNOSIS — R91.1 LUNG NODULE: ICD-10-CM

## 2018-05-21 DIAGNOSIS — Z00.00 ROUTINE GENERAL MEDICAL EXAMINATION AT A HEALTH CARE FACILITY: Primary | ICD-10-CM

## 2018-05-21 DIAGNOSIS — R91.1 NODULE OF RIGHT LUNG: ICD-10-CM

## 2018-05-21 DIAGNOSIS — R73.09 ELEVATED GLUCOSE: ICD-10-CM

## 2018-05-21 DIAGNOSIS — Z00.00 ROUTINE PHYSICAL EXAMINATION: Primary | ICD-10-CM

## 2018-05-21 DIAGNOSIS — E78.5 DYSLIPIDEMIA: ICD-10-CM

## 2018-05-21 LAB
ALBUMIN SERPL BCP-MCNC: 3.8 G/DL
ALP SERPL-CCNC: 69 U/L
ALT SERPL W/O P-5'-P-CCNC: 41 U/L
ANION GAP SERPL CALC-SCNC: 9 MMOL/L
AST SERPL-CCNC: 34 U/L
BILIRUB SERPL-MCNC: 0.5 MG/DL
BUN SERPL-MCNC: 15 MG/DL
CALCIUM SERPL-MCNC: 9.5 MG/DL
CHLORIDE SERPL-SCNC: 107 MMOL/L
CHOLEST SERPL-MCNC: 254 MG/DL
CHOLEST/HDLC SERPL: 5.2 {RATIO}
CO2 SERPL-SCNC: 24 MMOL/L
COMPLEXED PSA SERPL-MCNC: 0.33 NG/ML
CREAT SERPL-MCNC: 0.9 MG/DL
ERYTHROCYTE [DISTWIDTH] IN BLOOD BY AUTOMATED COUNT: 12 %
EST. GFR  (AFRICAN AMERICAN): >60 ML/MIN/1.73 M^2
EST. GFR  (NON AFRICAN AMERICAN): >60 ML/MIN/1.73 M^2
ESTIMATED AVG GLUCOSE: 146 MG/DL
GLUCOSE SERPL-MCNC: 141 MG/DL
HBA1C MFR BLD HPLC: 6.7 %
HCT VFR BLD AUTO: 45.3 %
HDLC SERPL-MCNC: 49 MG/DL
HDLC SERPL: 19.3 %
HGB BLD-MCNC: 15.1 G/DL
LDLC SERPL CALC-MCNC: 168.4 MG/DL
MCH RBC QN AUTO: 30.2 PG
MCHC RBC AUTO-ENTMCNC: 33.3 G/DL
MCV RBC AUTO: 91 FL
NONHDLC SERPL-MCNC: 205 MG/DL
PLATELET # BLD AUTO: 176 K/UL
PMV BLD AUTO: 11.2 FL
POTASSIUM SERPL-SCNC: 4.6 MMOL/L
PROT SERPL-MCNC: 7.2 G/DL
RBC # BLD AUTO: 5 M/UL
SODIUM SERPL-SCNC: 140 MMOL/L
TRIGL SERPL-MCNC: 183 MG/DL
TSH SERPL DL<=0.005 MIU/L-ACNC: 1.32 UIU/ML
WBC # BLD AUTO: 6 K/UL

## 2018-05-21 PROCEDURE — 97802 MEDICAL NUTRITION INDIV IN: CPT | Mod: S$GLB,,, | Performed by: INTERNAL MEDICINE

## 2018-05-21 PROCEDURE — 80053 COMPREHEN METABOLIC PANEL: CPT

## 2018-05-21 PROCEDURE — 99999 PR PBB SHADOW E&M-EST. PATIENT-LVL III: CPT | Mod: PBBFAC,,, | Performed by: INTERNAL MEDICINE

## 2018-05-21 PROCEDURE — 84443 ASSAY THYROID STIM HORMONE: CPT

## 2018-05-21 PROCEDURE — 99386 PREV VISIT NEW AGE 40-64: CPT | Mod: ,,, | Performed by: INTERNAL MEDICINE

## 2018-05-21 PROCEDURE — 85027 COMPLETE CBC AUTOMATED: CPT

## 2018-05-21 PROCEDURE — 83036 HEMOGLOBIN GLYCOSYLATED A1C: CPT

## 2018-05-21 PROCEDURE — 97750 PHYSICAL PERFORMANCE TEST: CPT | Mod: S$GLB,,, | Performed by: INTERNAL MEDICINE

## 2018-05-21 PROCEDURE — 80061 LIPID PANEL: CPT

## 2018-05-21 PROCEDURE — 84153 ASSAY OF PSA TOTAL: CPT

## 2018-05-21 NOTE — PROGRESS NOTES
Nutrition Assessment  Client name:  Porter Rice  :  1965  Age:  53 y.o.  Gender:  male    Client states:  Very pleasant spouse of a [employer] employee here for his annual Executive Health physical.  Familiar to me due to previous nutrition consults, during which time we discussed the importance of lifestyle modifications so as to improve body weight and risk factors for DM and CVD.  Patient, however, admits to dietary noncompliance, noting that although he has reduced his intake of fruit juice, he continues to dine out daily for lunch despite his wife's constant encouragement to pack lunch.  Selects mostly unhealthy entrees.  Nevertheless, considers his breakfast and dinner meals to be adequate yet desires for increased vegetable intake as his wife does not prepare many vegetables.  Also, enjoys snacking on sugary or salty foods after dinner.  Regarding exercise, maintains a gym membership at a [employer] yet is not exercising at this time.  Realizes the health benefits associated with such, noting that the fitness consult revealed a 9# weight gain since last year.  Upon review of lab results this morning, expressed concern specifically regarding his HbA1c as his son has Type 1 DM, and his father has Type 2 DM.  Is aware and knowledgeable regarding the s/s of DM along with its potential complications.  Expressed readiness to change, admitting that it is time to pay closer attention to his food choices.  Believes he can achieve 5% weight loss over the next 6 months.    No past medical history on file.    Social History    Marital status:    Employment:  [employer]  Social History   Substance Use Topics    Smoking status: Former Smoker     Packs/day: 1.00     Years: 29.00     Types: Cigarettes     Quit date: 8/3/2010    Smokeless tobacco: Never Used    Alcohol use 0.0 oz/week        Current medications:  currently has no medications in their medication list.  Vitamins, minerals, and/or  "supplements:  None   Food allergies or intolerances:  NKFA     Food History  Breakfast:  Cereal/1 egg + 1 slice whole wheat toast  Mid-morning Snack:  Protein bar  Lunch:  (typically eaten out) Did not provide further specifics regarding selected entrees.  Mid-afternoon Snack:  None  Dinner:  Homemade Chinese stir patterson comprised of ground beef, rice, and various vegetables  H.S. Snack:  Popcorn/ice cream/chocolate candy    Exercise History:  None    Lab Reports   Total Cholesterol:  254    Triglycerides:  183  HDL:  49  LDL:  168.4   Glucose:  141  HbA1c:  6.7%  BP:  120/82     Weight History  Height:  5' 10.5"     Weight:  241#  BMI:  34.1  % Body Fat:  28.2%    Diagnosis  RMR (Method:  Body [place]):  2100 kcal  Activity Factor:  1.3  ASHISH:  2730 - 500 = 2230 kcal    1.  Altered nutrition-related laboratory values related to improper food choices and inadequate physical activity as evidenced by TC:  254; TGL:  183; LDL:  168.4; glucose:  141; HbA1c:  6.7%.    2.  Obesity related to excessive energy intake and inadequate physical activity as evidenced by BMI:  34.1; 28.2% body fat.    Intervention    Goals:  1.  Achieve 5% (12#) weight loss x 6 months and 10% (24#) weight loss x 12 months  2.  Begin exercising, aiming for 30-45 minutes 3x/week as tolerated  3.  Reduce dining out frequency to 2x/week or less  4.  Pack lunch 3x/week or more  5.  Incorporate a 1/2 plate of non-starchy vegetables with lunch and dinner daily  6.  Reduce H.S. Snacking  7.  HbA1c < 5.7%; lipid panel:  WNL    Reviewed CMP, lipid panel, and HbA1c, noting lipid abnormalities in addition to elevated HbA1c indicative of DM according to the ADA.  Discussed such with patient in great detail, stressing the overall importance of lifestyle modifications, including proper nutrition, physical activity, and weight management.  Patient expressed previous knowledge of the s/s, risk factors, and complications of DM.  However, reviewed ADA's reference ranges " for glucose and HbA1c as well as dietary strategies to improve both.  Encouraged patient to resume exercise 3x/week as tolerated, focusing on cardiovascular activities.  Also, discussed the potential health consequences of frequent dining out, advising patient to reduce frequency by packing lunch 3x/week or more.  Encouraged patient to include a 1/2 plate of non-starchy vegetables with meals for added fiber and satiety.  Also, reviewed the effects night time snacking has on weight and glucose management, advising patient to consider reducing/eliminating.  Had a lengthy discussion regarding weight loss, including consequences of obesity, benefits of 5-10% weight loss, importance of setting S.M.A.R.T. goals, and ways to achieve sustainable weight loss via behavior changes.  Took detailed notes for patient outlining goals discussed above, which patient verbalized understanding toward.  Expressed motivation and readiness to change, appearing in the Contemplation Phase of Change.     Handouts provided:  Meal Planning Guide  Restaurant Guide  Eat Fit Shopping List  Eat Fit Sandie  Fast Food Guide  Vitamin/Mineral Guide  CHO Counting for People with DM  Heart-Healthy Eating Nutrition Therapy    Monitoring/Evaluation    Monitor the following:  Weight  BMI  % Body Fat  Caloric intake  Lipid panel  Glucose  HbA1c    Follow Up Plan:  Follow up with client in 1-2 years

## 2018-05-21 NOTE — PROGRESS NOTES
Subjective:       Patient ID: Porter Rice is a 53 y.o. male.    Chief Complaint: Executive Health    HPI:  Patient comes in for executive health physical saw me 2 years ago someone else last year.  Overall he says he feels fairly well although has not following a much.  Total cholesterol is elevated as is his sugar I explained that his sugar is in the range consistent mild diabetes but a little lower it would fall out of also explained if with diabetes he should be on a cholesterol-lowering medication possibly should be on 1 anyway.  We discussed opportunities for improving diet reduced calories, reduced candies, cookies, cake, ice cream as well as starches, rice bread and pasta.  He says he feels well and otherwise should be able to make these changes.  We will reassess labs in a few months make further decisions or recommendations.  He also is due for a follow-up CT scan to measure prior nodule of lung hopefully this be unchanged and if so no more follow-up will be necessary      Review of Systems   Constitutional: Negative for chills, fatigue, fever and unexpected weight change.   HENT: Negative for ear pain and sore throat.    Eyes: Negative for pain and visual disturbance.   Respiratory: Negative for cough, shortness of breath and wheezing.    Cardiovascular: Negative for chest pain and leg swelling.   Gastrointestinal: Negative for abdominal pain, constipation, diarrhea, nausea and vomiting.   Genitourinary: Negative for difficulty urinating, frequency and hematuria.   Musculoskeletal: Negative for arthralgias, back pain, myalgias, neck pain and neck stiffness.   Skin: Negative for rash and wound.   Neurological: Negative for dizziness, numbness and headaches.   Hematological: Negative for adenopathy.   Psychiatric/Behavioral: Negative for dysphoric mood. The patient is not nervous/anxious.        Objective:      Physical Exam   Constitutional: He is oriented to person, place, and time. He appears  well-developed and well-nourished. No distress.   HENT:   Head: Normocephalic and atraumatic.   Right Ear: External ear normal.   Left Ear: External ear normal.   Nose: Nose normal. No rhinorrhea.   Mouth/Throat: Oropharynx is clear and moist and mucous membranes are normal. No oropharyngeal exudate.   Eyes: Conjunctivae and EOM are normal. Pupils are equal, round, and reactive to light.   Neck: Normal range of motion. Neck supple. No JVD present. No thyromegaly present.   No supraclavicular nodes palpated bilaterally.    Cardiovascular: Normal rate, regular rhythm, normal heart sounds and intact distal pulses.    No murmur heard.  Pulmonary/Chest: Effort normal and breath sounds normal. He has no wheezes. He has no rales.   Abdominal: Soft. Bowel sounds are normal. He exhibits no distension and no mass. There is no tenderness.   Genitourinary: Rectum normal and prostate normal. Rectal exam shows no tenderness and guaiac negative stool. Prostate is not enlarged and not tender.   Genitourinary Comments: Hemoccult Card Test revealed + control.    Musculoskeletal: Normal range of motion. He exhibits no edema or tenderness.   Lymphadenopathy:     He has no cervical adenopathy.        Right: No supraclavicular adenopathy present.        Left: No supraclavicular adenopathy present.   Neurological: He is alert and oriented to person, place, and time. He has normal reflexes. No cranial nerve deficit.   Reflex Scores:       Bicep reflexes are 2+ on the right side and 2+ on the left side.       Patellar reflexes are 2+ on the right side and 2+ on the left side.  Skin: Skin is warm and dry. No rash noted.   Psychiatric: He has a normal mood and affect. His behavior is normal. He does not exhibit a depressed mood.       Assessment:       1. Routine physical examination    2. Elevated glucose    3. Dyslipidemia    4. Nodule of right lung    5. Lung nodule        Plan:       Porter was seen today for executive  health.    Diagnoses and all orders for this visit:    Routine physical examination    Elevated glucose  -     Hemoglobin A1c; Future  -     Lipid panel; Future    Dyslipidemia  -     Hemoglobin A1c; Future  -     Lipid panel; Future    Nodule of right lung    Lung nodule  -     CT Chest Without Contrast; Future

## 2018-05-21 NOTE — LETTER
May 21, 2018    Porter Rice  [address]  [place] [address]             [facility]  [address]  [facility] [address]  Phone: [phone]  Fax: [phone] Dear Mr. Rice:        Thank you for allowing me to serve you and perform your Executive Health exam on 5/21/2018.  This letter will serve a brief summary of the history, physical findings, and laboratory/studies performed and recommendations at that time.    Reason for Visit: Executive Health Preventive Physical Examination    Subjective:       Patient ID: Porter Rice is a 53 y.o. male.    Chief Complaint: Executive Health    HPI:  Patient comes in for executive health physical saw me 2 years ago someone else last year.  Overall he says he feels fairly well although has not following a much.  Total cholesterol is elevated as is his sugar I explained that his sugar is in the range consistent mild diabetes but a little lower it would fall out of also explained if with diabetes he should be on a cholesterol-lowering medication possibly should be on 1 anyway.  We discussed opportunities for improving diet reduced calories, reduced candies, cookies, cake, ice cream as well as starches, rice bread and pasta.  He says he feels well and otherwise should be able to make these changes.  We will reassess labs in a few months make further decisions or recommendations.  He also is due for a follow-up CT scan to measure prior nodule of lung hopefully this be unchanged and if so no more follow-up will be necessary      Review of Systems   Constitutional: Negative for chills, fatigue, fever and unexpected weight change.   HENT: Negative for ear pain and sore throat.    Eyes: Negative for pain and visual disturbance.   Respiratory: Negative for cough, shortness of breath and wheezing.    Cardiovascular: Negative for chest pain and leg swelling.   Gastrointestinal: Negative for abdominal pain, constipation, diarrhea, nausea  and vomiting.   Genitourinary: Negative for difficulty urinating, frequency and hematuria.   Musculoskeletal: Negative for arthralgias, back pain, myalgias, neck pain and neck stiffness.   Skin: Negative for rash and wound.   Neurological: Negative for dizziness, numbness and headaches.   Hematological: Negative for adenopathy.   Psychiatric/Behavioral: Negative for dysphoric mood. The patient is not nervous/anxious.        Objective:      Physical Exam   Constitutional: He is oriented to person, place, and time. He appears well-developed and well-nourished. No distress.   HENT:   Head: Normocephalic and atraumatic.   Right Ear: External ear normal.   Left Ear: External ear normal.   Nose: Nose normal. No rhinorrhea.   Mouth/Throat: Oropharynx is clear and moist and mucous membranes are normal. No oropharyngeal exudate.   Eyes: Conjunctivae and EOM are normal. Pupils are equal, round, and reactive to light.   Neck: Normal range of motion. Neck supple. No JVD present. No thyromegaly present.   No supraclavicular nodes palpated bilaterally.    Cardiovascular: Normal rate, regular rhythm, normal heart sounds and intact distal pulses.    No murmur heard.  Pulmonary/Chest: Effort normal and breath sounds normal. He has no wheezes. He has no rales.   Abdominal: Soft. Bowel sounds are normal. He exhibits no distension and no mass. There is no tenderness.   Genitourinary: Rectum normal and prostate normal. Rectal exam shows no tenderness and guaiac negative stool. Prostate is not enlarged and not tender.   Genitourinary Comments: Hemoccult Card Test revealed + control.    Musculoskeletal: Normal range of motion. He exhibits no edema or tenderness.   Lymphadenopathy:     He has no cervical adenopathy.        Right: No supraclavicular adenopathy present.        Left: No supraclavicular adenopathy present.   Neurological: He is alert and oriented to person, place, and time. He has normal reflexes. No cranial nerve deficit.    Reflex Scores:       Bicep reflexes are 2+ on the right side and 2+ on the left side.       Patellar reflexes are 2+ on the right side and 2+ on the left side.  Skin: Skin is warm and dry. No rash noted.   Psychiatric: He has a normal mood and affect. His behavior is normal. He does not exhibit a depressed mood.       Assessment:       1. Routine physical examination    2. Elevated glucose    3. Dyslipidemia    4. Nodule of right lung    5. Lung nodule        Plan:       Porter was seen today for [facility].    Diagnoses and all orders for this visit:    Routine physical examination    Elevated glucose  -     Hemoglobin A1c; Future  -     Lipid panel; Future    Dyslipidemia  -     Hemoglobin A1c; Future  -     Lipid panel; Future    Nodule of right lung    Lung nodule  -     CT Chest Without Contrast; Future             Labs:  Results for orders placed or performed in visit on 05/21/18   Comprehensive metabolic panel   Result Value Ref Range    Sodium 140 136 - 145 mmol/L    Potassium 4.6 3.5 - 5.1 mmol/L    Chloride 107 95 - 110 mmol/L    CO2 24 23 - 29 mmol/L    Glucose 141 (H) 70 - 110 mg/dL    BUN, Bld 15 6 - 20 mg/dL    Creatinine 0.9 0.5 - 1.4 mg/dL    Calcium 9.5 8.7 - 10.5 mg/dL    Total Protein 7.2 6.0 - 8.4 g/dL    Albumin 3.8 3.5 - 5.2 g/dL    Total Bilirubin 0.5 0.1 - 1.0 mg/dL    Alkaline Phosphatase 69 55 - 135 U/L    AST 34 10 - 40 U/L    ALT 41 10 - 44 U/L    Anion Gap 9 8 - 16 mmol/L    eGFR if African American >60.0 >60 mL/min/1.73 m^2    eGFR if non African American >60.0 >60 mL/min/1.73 m^2   CBC Without Differential   Result Value Ref Range    WBC 6.00 3.90 - 12.70 K/uL    RBC 5.00 4.60 - 6.20 M/uL    Hemoglobin 15.1 14.0 - 18.0 g/dL    Hematocrit 45.3 40.0 - 54.0 %    MCV 91 82 - 98 fL    MCH 30.2 27.0 - 31.0 pg    MCHC 33.3 32.0 - 36.0 g/dL    RDW 12.0 11.5 - 14.5 %    Platelets 176 150 - 350 K/uL    MPV 11.2 9.2 - 12.9 fL   Lipid panel   Result Value Ref Range    Cholesterol 254 (H)  120 - 199 mg/dL    Triglycerides 183 (H) 30 - 150 mg/dL    HDL 49 40 - 75 mg/dL    LDL Cholesterol 168.4 (H) 63.0 - 159.0 mg/dL    HDL/Chol Ratio 19.3 (L) 20.0 - 50.0 %    Total Cholesterol/HDL Ratio 5.2 (H) 2.0 - 5.0    Non-HDL Cholesterol 205 mg/dL   TSH   Result Value Ref Range    TSH 1.321 0.400 - 4.000 uIU/mL   PSA, Screening (every year)   Result Value Ref Range    PSA, SCREEN 0.33 0.00 - 4.00 ng/mL   Hemoglobin A1c   Result Value Ref Range    Hemoglobin A1C 6.7 (H) 4.0 - 5.6 %    Estimated Avg Glucose 146 (H) 68 - 131 mg/dL      Work on your diet, weight, exercise and reducing sugars and starches.    Assessment/Recommendations:  Routine Health Maintenance    At this time, you appear to be in good medical condition.  I look forward to seeing you again next year.  Please contact me should you have any questions or concerns regarding physical findings, or my recommendations.              If you have any questions or concerns, please don't hesitate to call.    Sincerely,        Zeeshan Ramos MD

## 2019-07-09 DIAGNOSIS — Z00.00 ROUTINE GENERAL MEDICAL EXAMINATION AT A HEALTH CARE FACILITY: Primary | ICD-10-CM

## 2019-08-26 ENCOUNTER — CLINICAL SUPPORT (OUTPATIENT)
Dept: INTERNAL MEDICINE | Facility: CLINIC | Age: 54
End: 2019-08-26
Payer: COMMERCIAL

## 2019-08-26 ENCOUNTER — HOSPITAL ENCOUNTER (OUTPATIENT)
Dept: CARDIOLOGY | Facility: CLINIC | Age: 54
Discharge: HOME | End: 2019-08-26
Payer: COMMERCIAL

## 2019-08-26 ENCOUNTER — OFFICE VISIT (OUTPATIENT)
Dept: INTERNAL MEDICINE | Facility: CLINIC | Age: 54
End: 2019-08-26
Payer: COMMERCIAL

## 2019-08-26 VITALS
OXYGEN SATURATION: 98 % | BODY MASS INDEX: 33.06 KG/M2 | WEIGHT: 236.13 LBS | HEART RATE: 61 BPM | SYSTOLIC BLOOD PRESSURE: 124 MMHG | DIASTOLIC BLOOD PRESSURE: 76 MMHG | HEIGHT: 71 IN

## 2019-08-26 DIAGNOSIS — Z80.0 FAMILY HISTORY OF COLON CANCER IN FATHER: ICD-10-CM

## 2019-08-26 DIAGNOSIS — E55.9 VITAMIN D DEFICIENCY: ICD-10-CM

## 2019-08-26 DIAGNOSIS — Z00.00 ROUTINE GENERAL MEDICAL EXAMINATION AT A HEALTH CARE FACILITY: ICD-10-CM

## 2019-08-26 DIAGNOSIS — Z00.00 ROUTINE GENERAL MEDICAL EXAMINATION AT A HEALTH CARE FACILITY: Primary | ICD-10-CM

## 2019-08-26 DIAGNOSIS — Z00.00 ROUTINE PHYSICAL EXAMINATION: Primary | ICD-10-CM

## 2019-08-26 DIAGNOSIS — D37.4 VILLOUS ADENOMA OF COLON: ICD-10-CM

## 2019-08-26 LAB
25(OH)D3+25(OH)D2 SERPL-MCNC: 19 NG/ML (ref 30–96)
ALBUMIN SERPL BCP-MCNC: 3.9 G/DL (ref 3.5–5.2)
ALP SERPL-CCNC: 60 U/L (ref 55–135)
ALT SERPL W/O P-5'-P-CCNC: 25 U/L (ref 10–44)
ANION GAP SERPL CALC-SCNC: 8 MMOL/L (ref 8–16)
AST SERPL-CCNC: 21 U/L (ref 10–40)
BILIRUB SERPL-MCNC: 0.5 MG/DL (ref 0.1–1)
BUN SERPL-MCNC: 16 MG/DL (ref 6–20)
CALCIUM SERPL-MCNC: 9.4 MG/DL (ref 8.7–10.5)
CHLORIDE SERPL-SCNC: 109 MMOL/L (ref 95–110)
CHOLEST SERPL-MCNC: 219 MG/DL (ref 120–199)
CHOLEST/HDLC SERPL: 4.5 {RATIO} (ref 2–5)
CO2 SERPL-SCNC: 27 MMOL/L (ref 23–29)
COMPLEXED PSA SERPL-MCNC: 0.23 NG/ML (ref 0–4)
CREAT SERPL-MCNC: 0.8 MG/DL (ref 0.5–1.4)
ERYTHROCYTE [DISTWIDTH] IN BLOOD BY AUTOMATED COUNT: 12 % (ref 11.5–14.5)
EST. GFR  (AFRICAN AMERICAN): >60 ML/MIN/1.73 M^2
EST. GFR  (NON AFRICAN AMERICAN): >60 ML/MIN/1.73 M^2
ESTIMATED AVG GLUCOSE: 128 MG/DL (ref 68–131)
GLUCOSE SERPL-MCNC: 125 MG/DL (ref 70–110)
HBA1C MFR BLD HPLC: 6.1 % (ref 4–5.6)
HCT VFR BLD AUTO: 44.7 % (ref 40–54)
HDLC SERPL-MCNC: 49 MG/DL (ref 40–75)
HDLC SERPL: 22.4 % (ref 20–50)
HGB BLD-MCNC: 14.5 G/DL (ref 14–18)
LDLC SERPL CALC-MCNC: 140.4 MG/DL (ref 63–159)
MCH RBC QN AUTO: 29.7 PG (ref 27–31)
MCHC RBC AUTO-ENTMCNC: 32.4 G/DL (ref 32–36)
MCV RBC AUTO: 92 FL (ref 82–98)
NONHDLC SERPL-MCNC: 170 MG/DL
PLATELET # BLD AUTO: 168 K/UL (ref 150–350)
PMV BLD AUTO: 11.4 FL (ref 9.2–12.9)
POTASSIUM SERPL-SCNC: 4.9 MMOL/L (ref 3.5–5.1)
PROT SERPL-MCNC: 6.7 G/DL (ref 6–8.4)
RBC # BLD AUTO: 4.88 M/UL (ref 4.6–6.2)
SODIUM SERPL-SCNC: 144 MMOL/L (ref 136–145)
TRIGL SERPL-MCNC: 148 MG/DL (ref 30–150)
TSH SERPL DL<=0.005 MIU/L-ACNC: 1.4 UIU/ML (ref 0.4–4)
WBC # BLD AUTO: 6.32 K/UL (ref 3.9–12.7)

## 2019-08-26 PROCEDURE — 80061 LIPID PANEL: CPT

## 2019-08-26 PROCEDURE — 85027 COMPLETE CBC AUTOMATED: CPT

## 2019-08-26 PROCEDURE — 93010 EKG 12-LEAD: ICD-10-PCS | Mod: S$PBB,,, | Performed by: INTERNAL MEDICINE

## 2019-08-26 PROCEDURE — 93010 ELECTROCARDIOGRAM REPORT: CPT | Mod: S$PBB,,, | Performed by: INTERNAL MEDICINE

## 2019-08-26 PROCEDURE — 82306 VITAMIN D 25 HYDROXY: CPT

## 2019-08-26 PROCEDURE — 93005 ELECTROCARDIOGRAM TRACING: CPT | Mod: PBBFAC | Performed by: INTERNAL MEDICINE

## 2019-08-26 PROCEDURE — 83036 HEMOGLOBIN GLYCOSYLATED A1C: CPT

## 2019-08-26 PROCEDURE — 99999 PR PBB SHADOW E&M-EST. PATIENT-LVL III: ICD-10-PCS | Mod: PBBFAC,,, | Performed by: INTERNAL MEDICINE

## 2019-08-26 PROCEDURE — 84443 ASSAY THYROID STIM HORMONE: CPT

## 2019-08-26 PROCEDURE — 99386 PREV VISIT NEW AGE 40-64: CPT | Mod: S$GLB,,, | Performed by: INTERNAL MEDICINE

## 2019-08-26 PROCEDURE — 80053 COMPREHEN METABOLIC PANEL: CPT

## 2019-08-26 PROCEDURE — 99999 PR PBB SHADOW E&M-EST. PATIENT-LVL III: CPT | Mod: PBBFAC,,, | Performed by: INTERNAL MEDICINE

## 2019-08-26 PROCEDURE — 84153 ASSAY OF PSA TOTAL: CPT

## 2019-08-26 PROCEDURE — 99386 PR PREVENTIVE VISIT,NEW,40-64: ICD-10-PCS | Mod: S$GLB,,, | Performed by: INTERNAL MEDICINE

## 2019-08-26 NOTE — LETTER
August 26, 2019    Porter Rice  [address]  [place] [address]             [facility]  [address]  [facility] [address]  Phone: [phone]  Fax: [phone] Dear Mr. Rice:        Thank you for allowing me to serve you and perform your Executive Health exam on 8/26/2019.  This letter will serve a brief summary of the history, physical findings, and laboratory/studies performed and recommendations at that time.    Reason for Visit: Executive Health Preventive Physical Examination    Subjective:       Patient ID: Porter Rice is a 54 y.o. male.    Chief Complaint: Executive Health    Executive Health Physical - doing well. Minor right shoulder pain. Aleve works well.   Still not prescription meds.   Vision and hearing stable.     EKG unchanged. Labs show slight improvement in sugar and lipids. TSH and PSA and Heme profile are fine. A1c is pending.     Review of Systems   Constitutional: Negative for chills, fatigue, fever and unexpected weight change.   HENT: Negative for ear pain and sore throat.    Eyes: Negative for pain and visual disturbance.   Respiratory: Negative for cough, shortness of breath and wheezing.    Cardiovascular: Negative for chest pain and leg swelling.   Gastrointestinal: Negative for abdominal pain, constipation, diarrhea, nausea and vomiting.   Genitourinary: Negative for difficulty urinating, frequency and hematuria.   Musculoskeletal: Positive for arthralgias (mild right shoulder). Negative for back pain, myalgias, neck pain and neck stiffness.   Skin: Negative for rash and wound.   Neurological: Negative for dizziness, numbness and headaches.   Hematological: Negative for adenopathy.   Psychiatric/Behavioral: Negative for dysphoric mood. The patient is not nervous/anxious.        Objective:      Physical Exam   Constitutional: He is oriented to person, place, and time. He appears well-developed and well-nourished. No distress.   HENT:    Head: Normocephalic and atraumatic.   Right Ear: External ear normal.   Left Ear: External ear normal.   Nose: Nose normal. No rhinorrhea.   Mouth/Throat: Oropharynx is clear and moist and mucous membranes are normal. No oropharyngeal exudate.   Eyes: Pupils are equal, round, and reactive to light. Conjunctivae and EOM are normal.   Neck: Normal range of motion. Neck supple. No JVD present. No thyromegaly present.   No supraclavicular nodes palpated bilaterally.    Cardiovascular: Normal rate, regular rhythm, normal heart sounds and intact distal pulses.   No murmur heard.  Pulmonary/Chest: Effort normal and breath sounds normal. He has no wheezes. He has no rales.   Abdominal: Soft. Bowel sounds are normal. He exhibits no distension and no mass. There is no tenderness.   Genitourinary: Rectum normal and prostate normal. Rectal exam shows no tenderness and guaiac negative stool. Prostate is not enlarged and not tender.   Genitourinary Comments: Hemoccult Card Test revealed + control.    Musculoskeletal: Normal range of motion. He exhibits no edema or tenderness.   Lymphadenopathy:     He has no cervical adenopathy.        Right: No supraclavicular adenopathy present.        Left: No supraclavicular adenopathy present.   Neurological: He is alert and oriented to person, place, and time. He has normal reflexes. No cranial nerve deficit.   Reflex Scores:       Bicep reflexes are 2+ on the right side and 2+ on the left side.       Patellar reflexes are 2+ on the right side and 2+ on the left side.  Skin: Skin is warm and dry. No rash noted.   Psychiatric: He has a normal mood and affect. His behavior is normal. He does not exhibit a depressed mood.       Assessment:       1. Routine physical examination    2. Family history of colon cancer in father    3. Villous adenoma of colon    4. Vitamin D deficiency        Plan:       Porter was seen today for [facility].    Diagnoses and all orders for this  visit:    Routine physical examination    Family history of colon cancer in father    Villous adenoma of colon    Vitamin D deficiency          Review final A1c and EKG reports.    Addendum:  A1c is 6.1%, significantly improved from prior.  Continue with weight reduction and reduction of sugars and starches in the diet.  EKG is stable-unchanged from last check.  Follow-up 1 year.     Labs:  Results for orders placed or performed in visit on 08/26/19   Comprehensive metabolic panel   Result Value Ref Range    Sodium 144 136 - 145 mmol/L    Potassium 4.9 3.5 - 5.1 mmol/L    Chloride 109 95 - 110 mmol/L    CO2 27 23 - 29 mmol/L    Glucose 125 (H) 70 - 110 mg/dL    BUN, Bld 16 6 - 20 mg/dL    Creatinine 0.8 0.5 - 1.4 mg/dL    Calcium 9.4 8.7 - 10.5 mg/dL    Total Protein 6.7 6.0 - 8.4 g/dL    Albumin 3.9 3.5 - 5.2 g/dL    Total Bilirubin 0.5 0.1 - 1.0 mg/dL    Alkaline Phosphatase 60 55 - 135 U/L    AST 21 10 - 40 U/L    ALT 25 10 - 44 U/L    Anion Gap 8 8 - 16 mmol/L    eGFR if African American >60.0 >60 mL/min/1.73 m^2    eGFR if non African American >60.0 >60 mL/min/1.73 m^2   CBC Without Differential   Result Value Ref Range    WBC 6.32 3.90 - 12.70 K/uL    RBC 4.88 4.60 - 6.20 M/uL    Hemoglobin 14.5 14.0 - 18.0 g/dL    Hematocrit 44.7 40.0 - 54.0 %    Mean Corpuscular Volume 92 82 - 98 fL    Mean Corpuscular Hemoglobin 29.7 27.0 - 31.0 pg    Mean Corpuscular Hemoglobin Conc 32.4 32.0 - 36.0 g/dL    RDW 12.0 11.5 - 14.5 %    Platelets 168 150 - 350 K/uL    MPV 11.4 9.2 - 12.9 fL   Lipid panel   Result Value Ref Range    Cholesterol 219 (H) 120 - 199 mg/dL    Triglycerides 148 30 - 150 mg/dL    HDL 49 40 - 75 mg/dL    LDL Cholesterol 140.4 63.0 - 159.0 mg/dL    Hdl/Cholesterol Ratio 22.4 20.0 - 50.0 %    Total Cholesterol/HDL Ratio 4.5 2.0 - 5.0    Non-HDL Cholesterol 170 mg/dL   TSH   Result Value Ref Range    TSH 1.397 0.400 - 4.000 uIU/mL   PSA, Screening (every year)   Result Value Ref Range    PSA, SCREEN 0.23  0.00 - 4.00 ng/mL   Hemoglobin A1c   Result Value Ref Range    Hemoglobin A1C 6.1 (H) 4.0 - 5.6 %    Estimated Avg Glucose 128 68 - 131 mg/dL   Vitamin D   Result Value Ref Range    Vit D, 25-Hydroxy 19 (L) 30 - 96 ng/mL      Supplement Vit D as we discussed.     Assessment/Recommendations:  Routine Health Maintenance    At this time, you appear to be in good medical condition.  I look forward to seeing you again next year.  Please contact me should you have any questions or concerns regarding physical findings, or my recommendations.              If you have any questions or concerns, please don't hesitate to call.    Sincerely,        Zeeshan Ramos MD

## 2019-08-26 NOTE — PROGRESS NOTES
Subjective:       Patient ID: Porter Rice is a 54 y.o. male.    Chief Complaint: Executive Health    Executive Health Physical - doing well. Minor right shoulder pain. Aleve works well.   Still not prescription meds.   Vision and hearing stable.     EKG unchanged. Labs show slight improvement in sugar and lipids. TSH and PSA and Heme profile are fine. A1c is pending.     Review of Systems   Constitutional: Negative for chills, fatigue, fever and unexpected weight change.   HENT: Negative for ear pain and sore throat.    Eyes: Negative for pain and visual disturbance.   Respiratory: Negative for cough, shortness of breath and wheezing.    Cardiovascular: Negative for chest pain and leg swelling.   Gastrointestinal: Negative for abdominal pain, constipation, diarrhea, nausea and vomiting.   Genitourinary: Negative for difficulty urinating, frequency and hematuria.   Musculoskeletal: Positive for arthralgias (mild right shoulder). Negative for back pain, myalgias, neck pain and neck stiffness.   Skin: Negative for rash and wound.   Neurological: Negative for dizziness, numbness and headaches.   Hematological: Negative for adenopathy.   Psychiatric/Behavioral: Negative for dysphoric mood. The patient is not nervous/anxious.        Objective:      Physical Exam   Constitutional: He is oriented to person, place, and time. He appears well-developed and well-nourished. No distress.   HENT:   Head: Normocephalic and atraumatic.   Right Ear: External ear normal.   Left Ear: External ear normal.   Nose: Nose normal. No rhinorrhea.   Mouth/Throat: Oropharynx is clear and moist and mucous membranes are normal. No oropharyngeal exudate.   Eyes: Pupils are equal, round, and reactive to light. Conjunctivae and EOM are normal.   Neck: Normal range of motion. Neck supple. No JVD present. No thyromegaly present.   No supraclavicular nodes palpated bilaterally.    Cardiovascular: Normal rate, regular rhythm, normal heart sounds  and intact distal pulses.   No murmur heard.  Pulmonary/Chest: Effort normal and breath sounds normal. He has no wheezes. He has no rales.   Abdominal: Soft. Bowel sounds are normal. He exhibits no distension and no mass. There is no tenderness.   Genitourinary: Rectum normal and prostate normal. Rectal exam shows no tenderness and guaiac negative stool. Prostate is not enlarged and not tender.   Genitourinary Comments: Hemoccult Card Test revealed + control.    Musculoskeletal: Normal range of motion. He exhibits no edema or tenderness.   Lymphadenopathy:     He has no cervical adenopathy.        Right: No supraclavicular adenopathy present.        Left: No supraclavicular adenopathy present.   Neurological: He is alert and oriented to person, place, and time. He has normal reflexes. No cranial nerve deficit.   Reflex Scores:       Bicep reflexes are 2+ on the right side and 2+ on the left side.       Patellar reflexes are 2+ on the right side and 2+ on the left side.  Skin: Skin is warm and dry. No rash noted.   Psychiatric: He has a normal mood and affect. His behavior is normal. He does not exhibit a depressed mood.       Assessment:       1. Routine physical examination    2. Family history of colon cancer in father    3. Villous adenoma of colon    4. Vitamin D deficiency        Plan:       Porter was seen today for [facility].    Diagnoses and all orders for this visit:    Routine physical examination    Family history of colon cancer in father    Villous adenoma of colon    Vitamin D deficiency          Review final A1c and EKG reports.    Addendum:  A1c is 6.1%, significantly improved from prior.  Continue with weight reduction and reduction of sugars and starches in the diet.  EKG is stable-unchanged from last check.  Follow-up 1 year.

## 2020-07-15 DIAGNOSIS — R91.1 SOLITARY PULMONARY NODULE: ICD-10-CM

## 2020-07-15 DIAGNOSIS — Z00.00 ROUTINE GENERAL MEDICAL EXAMINATION AT A HEALTH CARE FACILITY: Primary | ICD-10-CM

## 2020-09-08 ENCOUNTER — TELEPHONE (OUTPATIENT)
Dept: INTERNAL MEDICINE | Facility: CLINIC | Age: 55
End: 2020-09-08

## 2020-09-08 NOTE — TELEPHONE ENCOUNTER
Chrissy TRISTAN Staff             Financial Call Center has not been able to contact patient.   Pt has a liability and/or residual balance due.              Is this procedure medically urgent or non-medically   Please respond via In-basket or contact [facility] @ [phone]                 Medical Urgency Definition     If you can Answer yes to one of the following questions, the   Case will be considered Medically Urgent and will be done as   Scheduled irrespective of whether [facility] will receive payment.     *If this particular case is not done as scheduled, would the patient   Experience loss of life?     *Loss of Limb?     *Irreversible loss of function?     *Would their condition significantly worsen?     If none of these questions have a yes answer, the case   Should be postponed until such a time as the finances   can be sorted out.

## 2020-09-09 ENCOUNTER — OFFICE VISIT (OUTPATIENT)
Dept: INTERNAL MEDICINE | Facility: CLINIC | Age: 55
End: 2020-09-09
Payer: COMMERCIAL

## 2020-09-09 ENCOUNTER — HOSPITAL ENCOUNTER (OUTPATIENT)
Dept: CARDIOLOGY | Facility: HOSPITAL | Age: 55
Discharge: HOME | End: 2020-09-09
Attending: INTERNAL MEDICINE
Payer: COMMERCIAL

## 2020-09-09 ENCOUNTER — HOSPITAL ENCOUNTER (OUTPATIENT)
Dept: RADIOLOGY | Facility: HOSPITAL | Age: 55
Discharge: HOME | End: 2020-09-09
Attending: INTERNAL MEDICINE
Payer: COMMERCIAL

## 2020-09-09 ENCOUNTER — CLINICAL SUPPORT (OUTPATIENT)
Dept: INTERNAL MEDICINE | Facility: CLINIC | Age: 55
End: 2020-09-09
Payer: COMMERCIAL

## 2020-09-09 VITALS
WEIGHT: 235 LBS | HEART RATE: 81 BPM | SYSTOLIC BLOOD PRESSURE: 108 MMHG | BODY MASS INDEX: 32.78 KG/M2 | OXYGEN SATURATION: 96 % | DIASTOLIC BLOOD PRESSURE: 80 MMHG

## 2020-09-09 VITALS — WEIGHT: 230 LBS | HEIGHT: 71 IN | BODY MASS INDEX: 32.2 KG/M2

## 2020-09-09 DIAGNOSIS — Z00.00 ROUTINE GENERAL MEDICAL EXAMINATION AT A HEALTH CARE FACILITY: Primary | ICD-10-CM

## 2020-09-09 DIAGNOSIS — Z00.00 ROUTINE PHYSICAL EXAMINATION: Primary | ICD-10-CM

## 2020-09-09 DIAGNOSIS — E78.5 HYPERLIPIDEMIA, UNSPECIFIED HYPERLIPIDEMIA TYPE: ICD-10-CM

## 2020-09-09 DIAGNOSIS — D36.9 ADENOMATOUS POLYP: ICD-10-CM

## 2020-09-09 DIAGNOSIS — R91.8 LUNG NODULES: ICD-10-CM

## 2020-09-09 DIAGNOSIS — R91.1 SOLITARY PULMONARY NODULE: ICD-10-CM

## 2020-09-09 DIAGNOSIS — Z12.11 COLON CANCER SCREENING: ICD-10-CM

## 2020-09-09 DIAGNOSIS — Z00.00 ROUTINE GENERAL MEDICAL EXAMINATION AT A HEALTH CARE FACILITY: ICD-10-CM

## 2020-09-09 DIAGNOSIS — E55.9 VITAMIN D DEFICIENCY: ICD-10-CM

## 2020-09-09 LAB
25(OH)D3+25(OH)D2 SERPL-MCNC: 23 NG/ML (ref 30–96)
ALBUMIN SERPL BCP-MCNC: 3.8 G/DL (ref 3.5–5.2)
ALP SERPL-CCNC: 68 U/L (ref 55–135)
ALT SERPL W/O P-5'-P-CCNC: 31 U/L (ref 10–44)
ANION GAP SERPL CALC-SCNC: 8 MMOL/L (ref 8–16)
AST SERPL-CCNC: 26 U/L (ref 10–40)
BILIRUB SERPL-MCNC: 0.5 MG/DL (ref 0.1–1)
BUN SERPL-MCNC: 15 MG/DL (ref 6–20)
CALCIUM SERPL-MCNC: 9.2 MG/DL (ref 8.7–10.5)
CHLORIDE SERPL-SCNC: 109 MMOL/L (ref 95–110)
CHOLEST SERPL-MCNC: 226 MG/DL (ref 120–199)
CHOLEST/HDLC SERPL: 4.4 {RATIO} (ref 2–5)
CO2 SERPL-SCNC: 22 MMOL/L (ref 23–29)
COMPLEXED PSA SERPL-MCNC: 0.26 NG/ML (ref 0–4)
CREAT SERPL-MCNC: 0.8 MG/DL (ref 0.5–1.4)
CV STRESS BASE HR: 62 BPM
DIASTOLIC BLOOD PRESSURE: 71 MMHG
ERYTHROCYTE [DISTWIDTH] IN BLOOD BY AUTOMATED COUNT: 12.3 % (ref 11.5–14.5)
EST. GFR  (AFRICAN AMERICAN): >60 ML/MIN/1.73 M^2
EST. GFR  (NON AFRICAN AMERICAN): >60 ML/MIN/1.73 M^2
ESTIMATED AVG GLUCOSE: 134 MG/DL (ref 68–131)
GLUCOSE SERPL-MCNC: 128 MG/DL (ref 70–110)
HBA1C MFR BLD HPLC: 6.3 % (ref 4–5.6)
HCT VFR BLD AUTO: 46.8 % (ref 40–54)
HCV AB SERPL QL IA: NEGATIVE
HDLC SERPL-MCNC: 51 MG/DL (ref 40–75)
HDLC SERPL: 22.6 % (ref 20–50)
HGB BLD-MCNC: 15.5 G/DL (ref 14–18)
LDLC SERPL CALC-MCNC: 145.6 MG/DL (ref 63–159)
MCH RBC QN AUTO: 30.8 PG (ref 27–31)
MCHC RBC AUTO-ENTMCNC: 33.1 G/DL (ref 32–36)
MCV RBC AUTO: 93 FL (ref 82–98)
NONHDLC SERPL-MCNC: 175 MG/DL
OHS CV CPX 1 MINUTE RECOVERY HEART RATE: 116 BPM
OHS CV CPX 85 PERCENT MAX PREDICTED HEART RATE MALE: 139
OHS CV CPX ESTIMATED METS: 13
OHS CV CPX MAX PREDICTED HEART RATE: 164
OHS CV CPX PATIENT IS FEMALE: 0
OHS CV CPX PATIENT IS MALE: 1
OHS CV CPX PEAK DIASTOLIC BLOOD PRESSURE: 70 MMHG
OHS CV CPX PEAK HEAR RATE: 155 BPM
OHS CV CPX PEAK RATE PRESSURE PRODUCT: NORMAL
OHS CV CPX PEAK SYSTOLIC BLOOD PRESSURE: 196 MMHG
OHS CV CPX PERCENT MAX PREDICTED HEART RATE ACHIEVED: 95
OHS CV CPX RATE PRESSURE PRODUCT PRESENTING: 6820
PLATELET # BLD AUTO: 166 K/UL (ref 150–350)
PMV BLD AUTO: 11.2 FL (ref 9.2–12.9)
POTASSIUM SERPL-SCNC: 4.4 MMOL/L (ref 3.5–5.1)
PROT SERPL-MCNC: 6.9 G/DL (ref 6–8.4)
RBC # BLD AUTO: 5.04 M/UL (ref 4.6–6.2)
SODIUM SERPL-SCNC: 139 MMOL/L (ref 136–145)
STRESS ECHO POST EXERCISE DUR MIN: 7 MINUTES
STRESS ECHO POST EXERCISE DUR SEC: 38 SECONDS
SYSTOLIC BLOOD PRESSURE: 110 MMHG
TRIGL SERPL-MCNC: 147 MG/DL (ref 30–150)
TSH SERPL DL<=0.005 MIU/L-ACNC: 1.24 UIU/ML (ref 0.4–4)
WBC # BLD AUTO: 5.93 K/UL (ref 3.9–12.7)

## 2020-09-09 PROCEDURE — 85027 COMPLETE CBC AUTOMATED: CPT

## 2020-09-09 PROCEDURE — 3008F PR BODY MASS INDEX (BMI) DOCUMENTED: ICD-10-PCS | Mod: CPTII,S$GLB,, | Performed by: INTERNAL MEDICINE

## 2020-09-09 PROCEDURE — 93018 EXERCISE STRESS - EKG (CUPID ONLY): ICD-10-PCS | Mod: ,,, | Performed by: INTERNAL MEDICINE

## 2020-09-09 PROCEDURE — 84153 ASSAY OF PSA TOTAL: CPT

## 2020-09-09 PROCEDURE — 86803 HEPATITIS C AB TEST: CPT

## 2020-09-09 PROCEDURE — 99999 PR PBB SHADOW E&M-EST. PATIENT-LVL III: ICD-10-PCS | Mod: PBBFAC,,, | Performed by: INTERNAL MEDICINE

## 2020-09-09 PROCEDURE — 93018 CV STRESS TEST I&R ONLY: CPT | Mod: ,,, | Performed by: INTERNAL MEDICINE

## 2020-09-09 PROCEDURE — 84443 ASSAY THYROID STIM HORMONE: CPT

## 2020-09-09 PROCEDURE — 83036 HEMOGLOBIN GLYCOSYLATED A1C: CPT

## 2020-09-09 PROCEDURE — G0297 LDCT FOR LUNG CA SCREEN: HCPCS | Mod: TC

## 2020-09-09 PROCEDURE — 80061 LIPID PANEL: CPT

## 2020-09-09 PROCEDURE — 99386 PR PREVENTIVE VISIT,NEW,40-64: ICD-10-PCS | Mod: S$GLB,,, | Performed by: INTERNAL MEDICINE

## 2020-09-09 PROCEDURE — 99999 PR PBB SHADOW E&M-EST. PATIENT-LVL III: CPT | Mod: PBBFAC,,, | Performed by: INTERNAL MEDICINE

## 2020-09-09 PROCEDURE — 93017 CV STRESS TEST TRACING ONLY: CPT

## 2020-09-09 PROCEDURE — G0297 CT CHEST LUNG SCREENING LOW DOSE: ICD-10-PCS | Mod: 26,ICN,, | Performed by: RADIOLOGY

## 2020-09-09 PROCEDURE — 82306 VITAMIN D 25 HYDROXY: CPT

## 2020-09-09 PROCEDURE — G0297 LDCT FOR LUNG CA SCREEN: HCPCS | Mod: 26,ICN,, | Performed by: RADIOLOGY

## 2020-09-09 PROCEDURE — 93016 CV STRESS TEST SUPVJ ONLY: CPT | Mod: ,,, | Performed by: INTERNAL MEDICINE

## 2020-09-09 PROCEDURE — 3008F BODY MASS INDEX DOCD: CPT | Mod: CPTII,S$GLB,, | Performed by: INTERNAL MEDICINE

## 2020-09-09 PROCEDURE — 99386 PREV VISIT NEW AGE 40-64: CPT | Mod: S$GLB,,, | Performed by: INTERNAL MEDICINE

## 2020-09-09 PROCEDURE — 93016 EXERCISE STRESS - EKG (CUPID ONLY): ICD-10-PCS | Mod: ,,, | Performed by: INTERNAL MEDICINE

## 2020-09-09 PROCEDURE — 80053 COMPREHEN METABOLIC PANEL: CPT

## 2020-09-09 NOTE — LETTER
September 9, 2020    Porter Rice  [address]  [place] [address]             [facility]  [address]  [facility] [address]  Phone: [phone]  Fax: [phone] Dear Mr. Rice:        Thank you for allowing me to serve you and perform your Executive Health exam on 9/9/2020.  This letter will serve a brief summary of the history, physical findings, and laboratory/studies performed and recommendations at that time.    Reason for Visit: Executive Health Preventive Physical Examination    Subjective:       Patient ID: Porter Rice is a 55 y.o. male.    Chief Complaint: Executive Health    HPI:  Patient here for executive health physical.  He says he is been doing well.  He had a stress test, labs and CT scan.  Stress test was unremarkable.  Labs showed slight increase in cholesterol and sugar but we will follow this time.  A few years ago he had a cardiac calcium score of 1.  Incidental nodule findings were repeated on the scan today.  The 0.8 cm nodule appear stable.  The left-sided 0.5 cm nodule has a benign appearance but may be new compared to 2016 follow-up 1 year was recommended.  Stressed importance of diet exercise and weight reduction  Patient due for a repeat colonoscopy because of a large adenoma polyp 3 years ago.  We will assist with that.    Review of Systems   Constitutional: Negative for chills, fatigue, fever and unexpected weight change.   HENT: Negative for nosebleeds and trouble swallowing.    Eyes: Negative for pain and visual disturbance.   Respiratory: Negative for cough, shortness of breath and wheezing.    Cardiovascular: Negative for chest pain and palpitations.   Gastrointestinal: Negative for abdominal pain, constipation, diarrhea, nausea and vomiting.   Genitourinary: Negative for difficulty urinating and hematuria.   Musculoskeletal: Negative for neck pain.   Integumentary:  Negative for rash.   Neurological: Negative for dizziness  and headaches.   Hematological: Does not bruise/bleed easily.   Psychiatric/Behavioral: Negative for dysphoric mood, sleep disturbance and suicidal ideas.         Objective:      Physical Exam  Constitutional:       General: He is not in acute distress.     Appearance: He is well-developed.   HENT:      Head: Normocephalic and atraumatic.      Right Ear: Tympanic membrane, ear canal and external ear normal.      Left Ear: Tympanic membrane, ear canal and external ear normal.      Nose: Nose normal. No rhinorrhea.      Mouth/Throat:      Pharynx: No oropharyngeal exudate or posterior oropharyngeal erythema.   Eyes:      Conjunctiva/sclera: Conjunctivae normal.      Pupils: Pupils are equal, round, and reactive to light.   Neck:      Musculoskeletal: Normal range of motion and neck supple.      Thyroid: No thyromegaly.      Vascular: No JVD.      Comments: No supraclavicular nodes palpated bilaterally.   Cardiovascular:      Rate and Rhythm: Normal rate and regular rhythm.      Heart sounds: Normal heart sounds. No murmur.   Pulmonary:      Effort: Pulmonary effort is normal.      Breath sounds: Normal breath sounds. No wheezing or rales.   Abdominal:      General: Bowel sounds are normal. There is no distension.      Palpations: Abdomen is soft. There is no mass.      Tenderness: There is no abdominal tenderness.   Genitourinary:     Prostate: Normal. Not enlarged and not tender.      Rectum: Normal. Guaiac result negative. No tenderness.   Musculoskeletal: Normal range of motion.         General: No tenderness.      Right lower leg: No edema.      Left lower leg: No edema.   Lymphadenopathy:      Cervical: No cervical adenopathy.      Upper Body:      Right upper body: No supraclavicular adenopathy.      Left upper body: No supraclavicular adenopathy.   Skin:     General: Skin is warm and dry.      Findings: No rash.   Neurological:      Mental Status: He is alert and oriented to person, place, and time.       Cranial Nerves: No cranial nerve deficit.      Deep Tendon Reflexes: Reflexes are normal and symmetric.      Reflex Scores:       Bicep reflexes are 2+ on the right side and 2+ on the left side.       Patellar reflexes are 2+ on the right side and 2+ on the left side.  Psychiatric:         Mood and Affect: Mood normal. Mood is not depressed.         Behavior: Behavior normal.         Assessment:       1. Routine physical examination    2. Vitamin D deficiency    3. Hyperlipidemia, unspecified hyperlipidemia type    4. Colon cancer screening    5. Adenomatous polyp    6. Lung nodules    7. Solitary pulmonary nodule        Plan:       Porter was seen today for [facility].    Diagnoses and all orders for this visit:    Routine physical examination    Vitamin D deficiency    Hyperlipidemia, unspecified hyperlipidemia type    Colon cancer screening    Adenomatous polyp  -     Case request GI: COLONOSCOPY    Lung nodules    Solitary pulmonary nodule  -     CT Chest Without Contrast; Future          Work on weight loss.  Need follow lipids and sugar in the future.  Repeat 1 more chest CT.     Labs:    Component      Latest Ref Rng & Units 9/9/2020   Sodium      136 - 145 mmol/L 139   Potassium      3.5 - 5.1 mmol/L 4.4   Chloride      95 - 110 mmol/L 109   CO2      23 - 29 mmol/L 22 (L)   Glucose      70 - 110 mg/dL 128 (H)   BUN, Bld      6 - 20 mg/dL 15   Creatinine      0.5 - 1.4 mg/dL 0.8   Calcium      8.7 - 10.5 mg/dL 9.2   PROTEIN TOTAL      6.0 - 8.4 g/dL 6.9   Albumin      3.5 - 5.2 g/dL 3.8   BILIRUBIN TOTAL      0.1 - 1.0 mg/dL 0.5   Alkaline Phosphatase      55 - 135 U/L 68   AST      10 - 40 U/L 26   ALT      10 - 44 U/L 31   Anion Gap      8 - 16 mmol/L 8   eGFR if African American      >60 mL/min/1.73 m:2 >60.0   eGFR if non African American      >60 mL/min/1.73 m:2 >60.0   WBC      3.90 - 12.70 K/uL 5.93   RBC      4.60 - 6.20 M/uL 5.04   Hemoglobin      14.0 - 18.0 g/dL 15.5   Hematocrit      40.0 -  54.0 % 46.8   MCV      82 - 98 fL 93   MCH      27.0 - 31.0 pg 30.8   MCHC      32.0 - 36.0 g/dL 33.1   RDW      11.5 - 14.5 % 12.3   Platelets      150 - 350 K/uL 166   MPV      9.2 - 12.9 fL 11.2   Cholesterol      120 - 199 mg/dL 226 (H)   Triglycerides      30 - 150 mg/dL 147   HDL      40 - 75 mg/dL 51   LDL Cholesterol External      63.0 - 159.0 mg/dL 145.6   Hdl/Cholesterol Ratio      20.0 - 50.0 % 22.6   Total Cholesterol/HDL Ratio      2.0 - 5.0 4.4   Non-HDL Cholesterol      mg/dL 175   Hemoglobin A1C External      4.0 - 5.6 % 6.3 (H)   Estimated Avg Glucose      68 - 131 mg/dL 134 (H)   TSH      0.400 - 4.000 uIU/mL 1.235   PSA, SCREEN      0.00 - 4.00 ng/mL 0.26   Vit D, 25-Hydroxy      30 - 96 ng/mL 23 (L)     Results for orders placed or performed during the hospital encounter of 09/09/20   Exercise Stress - EKG   Result Value Ref Range    Systolic blood pressure 110.0 mmHg    Diastolic blood pressure 71.0 mmHg    HR at rest 62.0 bpm    Exercise duration (min) 7 minutes    Exercise duration (sec) 38 seconds    Peak Systolic .0 mmHg    Peak Diatolic BP 70.0 mmHg    Peak .0 bpm    Estimated METs 13.0     85% Max Predicted      % Max HR Achieved 95     1 Minute Recovery .0 bpm    RPP 6,820     Peak RPP 30,380     Max Predicted      [facility] CV CPX PATIENT IS MALE 1     [facility] CV CPX PATIENT IS FEMALE 0       And as we discussed by phone we will plan on repeating the CT scan of the chest 1 more time make sure both sided nodules are stable hopefully if so we can stop next visit.    Assessment/Recommendations:  Routine Health Maintenance    At this time, you appear to be in good medical condition.  I look forward to seeing you again next year.  Please contact me should you have any questions or concerns regarding physical findings, or my recommendations.              If you have any questions or concerns, please don't hesitate to call.    Sincerely,        Zeeshan Ramos MD

## 2020-09-09 NOTE — PROGRESS NOTES
Subjective:       Patient ID: Porter Rice is a 55 y.o. male.    Chief Complaint: Executive Health    HPI:  Patient here for executive health physical.  He says he is been doing well.  He had a stress test, labs and CT scan.  Stress test was unremarkable.  Labs showed slight increase in cholesterol and sugar but we will follow this time.  A few years ago he had a cardiac calcium score of 1.  Incidental nodule findings were repeated on the scan today.  The 0.8 cm nodule appear stable.  The left-sided 0.5 cm nodule has a benign appearance but may be new compared to 2016 follow-up 1 year was recommended.  Stressed importance of diet exercise and weight reduction  Patient due for a repeat colonoscopy because of a large adenoma polyp 3 years ago.  We will assist with that.    Review of Systems   Constitutional: Negative for chills, fatigue, fever and unexpected weight change.   HENT: Negative for nosebleeds and trouble swallowing.    Eyes: Negative for pain and visual disturbance.   Respiratory: Negative for cough, shortness of breath and wheezing.    Cardiovascular: Negative for chest pain and palpitations.   Gastrointestinal: Negative for abdominal pain, constipation, diarrhea, nausea and vomiting.   Genitourinary: Negative for difficulty urinating and hematuria.   Musculoskeletal: Negative for neck pain.   Integumentary:  Negative for rash.   Neurological: Negative for dizziness and headaches.   Hematological: Does not bruise/bleed easily.   Psychiatric/Behavioral: Negative for dysphoric mood, sleep disturbance and suicidal ideas.         Objective:      Physical Exam  Constitutional:       General: He is not in acute distress.     Appearance: He is well-developed.   HENT:      Head: Normocephalic and atraumatic.      Right Ear: Tympanic membrane, ear canal and external ear normal.      Left Ear: Tympanic membrane, ear canal and external ear normal.      Nose: Nose normal. No rhinorrhea.      Mouth/Throat:       Pharynx: No oropharyngeal exudate or posterior oropharyngeal erythema.   Eyes:      Conjunctiva/sclera: Conjunctivae normal.      Pupils: Pupils are equal, round, and reactive to light.   Neck:      Musculoskeletal: Normal range of motion and neck supple.      Thyroid: No thyromegaly.      Vascular: No JVD.      Comments: No supraclavicular nodes palpated bilaterally.   Cardiovascular:      Rate and Rhythm: Normal rate and regular rhythm.      Heart sounds: Normal heart sounds. No murmur.   Pulmonary:      Effort: Pulmonary effort is normal.      Breath sounds: Normal breath sounds. No wheezing or rales.   Abdominal:      General: Bowel sounds are normal. There is no distension.      Palpations: Abdomen is soft. There is no mass.      Tenderness: There is no abdominal tenderness.   Genitourinary:     Prostate: Normal. Not enlarged and not tender.      Rectum: Normal. Guaiac result negative. No tenderness.   Musculoskeletal: Normal range of motion.         General: No tenderness.      Right lower leg: No edema.      Left lower leg: No edema.   Lymphadenopathy:      Cervical: No cervical adenopathy.      Upper Body:      Right upper body: No supraclavicular adenopathy.      Left upper body: No supraclavicular adenopathy.   Skin:     General: Skin is warm and dry.      Findings: No rash.   Neurological:      Mental Status: He is alert and oriented to person, place, and time.      Cranial Nerves: No cranial nerve deficit.      Deep Tendon Reflexes: Reflexes are normal and symmetric.      Reflex Scores:       Bicep reflexes are 2+ on the right side and 2+ on the left side.       Patellar reflexes are 2+ on the right side and 2+ on the left side.  Psychiatric:         Mood and Affect: Mood normal. Mood is not depressed.         Behavior: Behavior normal.         Assessment:       1. Routine physical examination    2. Vitamin D deficiency    3. Hyperlipidemia, unspecified hyperlipidemia type    4. Colon cancer screening     5. Adenomatous polyp    6. Lung nodules    7. Solitary pulmonary nodule        Plan:       Porter was seen today for [facility].    Diagnoses and all orders for this visit:    Routine physical examination    Vitamin D deficiency    Hyperlipidemia, unspecified hyperlipidemia type    Colon cancer screening    Adenomatous polyp  -     Case request GI: COLONOSCOPY    Lung nodules    Solitary pulmonary nodule  -     CT Chest Without Contrast; Future          Work on weight loss.  Need follow lipids and sugar in the future.  Repeat 1 more chest CT.

## 2021-04-05 ENCOUNTER — IMMUNIZATION (OUTPATIENT)
Dept: PRIMARY CARE CLINIC | Facility: CLINIC | Age: 56
End: 2021-04-05

## 2021-04-05 DIAGNOSIS — Z23 NEED FOR VACCINATION: Primary | ICD-10-CM

## 2021-04-05 PROCEDURE — 91300 COVID-19, MRNA, LNP-S, PF, 30 MCG/0.3 ML DOSE VACCINE: CPT | Mod: S$GLB,,, | Performed by: INTERNAL MEDICINE

## 2021-04-05 PROCEDURE — 0001A COVID-19, MRNA, LNP-S, PF, 30 MCG/0.3 ML DOSE VACCINE: ICD-10-PCS | Mod: CV19,S$GLB,, | Performed by: INTERNAL MEDICINE

## 2021-04-05 PROCEDURE — 0001A COVID-19, MRNA, LNP-S, PF, 30 MCG/0.3 ML DOSE VACCINE: CPT | Mod: CV19,S$GLB,, | Performed by: INTERNAL MEDICINE

## 2021-04-05 PROCEDURE — 91300 COVID-19, MRNA, LNP-S, PF, 30 MCG/0.3 ML DOSE VACCINE: ICD-10-PCS | Mod: S$GLB,,, | Performed by: INTERNAL MEDICINE

## 2021-04-26 ENCOUNTER — IMMUNIZATION (OUTPATIENT)
Dept: PRIMARY CARE CLINIC | Facility: CLINIC | Age: 56
End: 2021-04-26
Payer: COMMERCIAL

## 2021-04-26 DIAGNOSIS — Z23 NEED FOR VACCINATION: Primary | ICD-10-CM

## 2021-04-26 PROCEDURE — 91300 COVID-19, MRNA, LNP-S, PF, 30 MCG/0.3 ML DOSE VACCINE: CPT | Mod: S$GLB,,, | Performed by: INTERNAL MEDICINE

## 2021-04-26 PROCEDURE — 0002A COVID-19, MRNA, LNP-S, PF, 30 MCG/0.3 ML DOSE VACCINE: CPT | Mod: CV19,S$GLB,, | Performed by: INTERNAL MEDICINE

## 2021-04-26 PROCEDURE — 0002A COVID-19, MRNA, LNP-S, PF, 30 MCG/0.3 ML DOSE VACCINE: ICD-10-PCS | Mod: CV19,S$GLB,, | Performed by: INTERNAL MEDICINE

## 2021-04-26 PROCEDURE — 91300 COVID-19, MRNA, LNP-S, PF, 30 MCG/0.3 ML DOSE VACCINE: ICD-10-PCS | Mod: S$GLB,,, | Performed by: INTERNAL MEDICINE

## 2021-06-23 DIAGNOSIS — Z00.00 ROUTINE GENERAL MEDICAL EXAMINATION AT A HEALTH CARE FACILITY: Primary | ICD-10-CM

## 2021-06-23 DIAGNOSIS — F17.210 NICOTINE DEPENDENCE, CIGARETTES, UNCOMPLICATED: ICD-10-CM

## 2021-06-29 ENCOUNTER — TELEPHONE (OUTPATIENT)
Dept: ENDOSCOPY | Facility: HOSPITAL | Age: 56
End: 2021-06-29

## 2021-06-29 DIAGNOSIS — Z12.11 COLON CANCER SCREENING: Primary | ICD-10-CM

## 2021-06-29 DIAGNOSIS — Z86.010 HISTORY OF COLON POLYPS: ICD-10-CM

## 2021-06-29 RX ORDER — SODIUM, POTASSIUM,MAG SULFATES 17.5-3.13G
1 SOLUTION, RECONSTITUTED, ORAL ORAL ONCE
Qty: 1 BOTTLE | Refills: 0 | Status: SHIPPED | OUTPATIENT
Start: 2021-06-29 | End: 2021-06-29

## 2021-08-09 ENCOUNTER — HOSPITAL ENCOUNTER (OUTPATIENT)
Dept: CARDIOLOGY | Facility: CLINIC | Age: 56
Discharge: HOME | End: 2021-08-09
Payer: COMMERCIAL

## 2021-08-09 ENCOUNTER — OFFICE VISIT (OUTPATIENT)
Dept: INTERNAL MEDICINE | Facility: CLINIC | Age: 56
End: 2021-08-09
Payer: COMMERCIAL

## 2021-08-09 ENCOUNTER — CLINICAL SUPPORT (OUTPATIENT)
Dept: INTERNAL MEDICINE | Facility: CLINIC | Age: 56
End: 2021-08-09
Payer: COMMERCIAL

## 2021-08-09 ENCOUNTER — HOSPITAL ENCOUNTER (OUTPATIENT)
Dept: RADIOLOGY | Facility: HOSPITAL | Age: 56
Discharge: HOME | End: 2021-08-09
Attending: INTERNAL MEDICINE
Payer: COMMERCIAL

## 2021-08-09 VITALS
WEIGHT: 232.13 LBS | HEART RATE: 80 BPM | RESPIRATION RATE: 16 BRPM | OXYGEN SATURATION: 97 % | BODY MASS INDEX: 33.23 KG/M2 | SYSTOLIC BLOOD PRESSURE: 114 MMHG | HEIGHT: 70 IN | DIASTOLIC BLOOD PRESSURE: 78 MMHG

## 2021-08-09 DIAGNOSIS — Z00.00 ANNUAL PHYSICAL EXAM: Primary | ICD-10-CM

## 2021-08-09 DIAGNOSIS — Z00.00 ROUTINE PHYSICAL EXAMINATION: Primary | ICD-10-CM

## 2021-08-09 DIAGNOSIS — E78.5 HYPERLIPIDEMIA, UNSPECIFIED HYPERLIPIDEMIA TYPE: ICD-10-CM

## 2021-08-09 DIAGNOSIS — F17.210 NICOTINE DEPENDENCE, CIGARETTES, UNCOMPLICATED: ICD-10-CM

## 2021-08-09 DIAGNOSIS — Z00.00 ROUTINE GENERAL MEDICAL EXAMINATION AT A HEALTH CARE FACILITY: ICD-10-CM

## 2021-08-09 DIAGNOSIS — R73.09 ELEVATED GLUCOSE LEVEL: ICD-10-CM

## 2021-08-09 DIAGNOSIS — R21 RASH: ICD-10-CM

## 2021-08-09 DIAGNOSIS — E55.9 VITAMIN D DEFICIENCY: ICD-10-CM

## 2021-08-09 LAB
25(OH)D3+25(OH)D2 SERPL-MCNC: 25 NG/ML (ref 30–96)
ALBUMIN SERPL BCP-MCNC: 3.7 G/DL (ref 3.5–5.2)
ALP SERPL-CCNC: 65 U/L (ref 55–135)
ALT SERPL W/O P-5'-P-CCNC: 30 U/L (ref 10–44)
ANION GAP SERPL CALC-SCNC: 9 MMOL/L (ref 8–16)
AST SERPL-CCNC: 24 U/L (ref 10–40)
BILIRUB SERPL-MCNC: 0.7 MG/DL (ref 0.1–1)
BUN SERPL-MCNC: 17 MG/DL (ref 6–20)
CALCIUM SERPL-MCNC: 9.9 MG/DL (ref 8.7–10.5)
CHLORIDE SERPL-SCNC: 104 MMOL/L (ref 95–110)
CHOLEST SERPL-MCNC: 228 MG/DL (ref 120–199)
CHOLEST/HDLC SERPL: 4.4 {RATIO} (ref 2–5)
CO2 SERPL-SCNC: 27 MMOL/L (ref 23–29)
COMPLEXED PSA SERPL-MCNC: 0.4 NG/ML (ref 0–4)
CREAT SERPL-MCNC: 0.9 MG/DL (ref 0.5–1.4)
ERYTHROCYTE [DISTWIDTH] IN BLOOD BY AUTOMATED COUNT: 12.2 % (ref 11.5–14.5)
EST. GFR  (AFRICAN AMERICAN): >60 ML/MIN/1.73 M^2
EST. GFR  (NON AFRICAN AMERICAN): >60 ML/MIN/1.73 M^2
ESTIMATED AVG GLUCOSE: 143 MG/DL (ref 68–131)
GLUCOSE SERPL-MCNC: 150 MG/DL (ref 70–110)
HBA1C MFR BLD: 6.6 % (ref 4–5.6)
HCT VFR BLD AUTO: 46.5 % (ref 40–54)
HDLC SERPL-MCNC: 52 MG/DL (ref 40–75)
HDLC SERPL: 22.8 % (ref 20–50)
HGB BLD-MCNC: 15.6 G/DL (ref 14–18)
LDLC SERPL CALC-MCNC: 141.4 MG/DL (ref 63–159)
MCH RBC QN AUTO: 30.4 PG (ref 27–31)
MCHC RBC AUTO-ENTMCNC: 33.5 G/DL (ref 32–36)
MCV RBC AUTO: 91 FL (ref 82–98)
NONHDLC SERPL-MCNC: 176 MG/DL
PLATELET # BLD AUTO: 174 K/UL (ref 150–450)
PMV BLD AUTO: 11 FL (ref 9.2–12.9)
POTASSIUM SERPL-SCNC: 4.2 MMOL/L (ref 3.5–5.1)
PROT SERPL-MCNC: 7 G/DL (ref 6–8.4)
RBC # BLD AUTO: 5.14 M/UL (ref 4.6–6.2)
SODIUM SERPL-SCNC: 140 MMOL/L (ref 136–145)
TRIGL SERPL-MCNC: 173 MG/DL (ref 30–150)
TSH SERPL DL<=0.005 MIU/L-ACNC: 2.05 UIU/ML (ref 0.4–4)
WBC # BLD AUTO: 7.35 K/UL (ref 3.9–12.7)

## 2021-08-09 PROCEDURE — 99999 PR PBB SHADOW E&M-EST. PATIENT-LVL III: CPT | Mod: PBBFAC,,, | Performed by: INTERNAL MEDICINE

## 2021-08-09 PROCEDURE — 85027 COMPLETE CBC AUTOMATED: CPT | Performed by: INTERNAL MEDICINE

## 2021-08-09 PROCEDURE — 1160F RVW MEDS BY RX/DR IN RCRD: CPT | Mod: CPTII,S$GLB,, | Performed by: INTERNAL MEDICINE

## 2021-08-09 PROCEDURE — 1160F PR REVIEW ALL MEDS BY PRESCRIBER/CLIN PHARMACIST DOCUMENTED: ICD-10-PCS | Mod: CPTII,S$GLB,, | Performed by: INTERNAL MEDICINE

## 2021-08-09 PROCEDURE — 3074F SYST BP LT 130 MM HG: CPT | Mod: CPTII,S$GLB,, | Performed by: INTERNAL MEDICINE

## 2021-08-09 PROCEDURE — 80053 COMPREHEN METABOLIC PANEL: CPT | Performed by: INTERNAL MEDICINE

## 2021-08-09 PROCEDURE — 3008F PR BODY MASS INDEX (BMI) DOCUMENTED: ICD-10-PCS | Mod: CPTII,S$GLB,, | Performed by: INTERNAL MEDICINE

## 2021-08-09 PROCEDURE — 82306 VITAMIN D 25 HYDROXY: CPT | Performed by: INTERNAL MEDICINE

## 2021-08-09 PROCEDURE — 93010 ELECTROCARDIOGRAM REPORT: CPT | Mod: S$GLB,,, | Performed by: INTERNAL MEDICINE

## 2021-08-09 PROCEDURE — 3008F BODY MASS INDEX DOCD: CPT | Mod: CPTII,S$GLB,, | Performed by: INTERNAL MEDICINE

## 2021-08-09 PROCEDURE — 93005 EKG 12-LEAD: ICD-10-PCS | Mod: S$GLB,,, | Performed by: INTERNAL MEDICINE

## 2021-08-09 PROCEDURE — 99999 PR PBB SHADOW E&M-EST. PATIENT-LVL III: ICD-10-PCS | Mod: PBBFAC,,, | Performed by: INTERNAL MEDICINE

## 2021-08-09 PROCEDURE — 99396 PR PREVENTIVE VISIT,EST,40-64: ICD-10-PCS | Mod: S$GLB,,, | Performed by: INTERNAL MEDICINE

## 2021-08-09 PROCEDURE — 71271 CT CHEST LUNG SCREENING LOW DOSE: ICD-10-PCS | Mod: 26,,, | Performed by: RADIOLOGY

## 2021-08-09 PROCEDURE — 3044F PR MOST RECENT HEMOGLOBIN A1C LEVEL <7.0%: ICD-10-PCS | Mod: CPTII,S$GLB,, | Performed by: INTERNAL MEDICINE

## 2021-08-09 PROCEDURE — 3044F HG A1C LEVEL LT 7.0%: CPT | Mod: CPTII,S$GLB,, | Performed by: INTERNAL MEDICINE

## 2021-08-09 PROCEDURE — 93010 EKG 12-LEAD: ICD-10-PCS | Mod: S$GLB,,, | Performed by: INTERNAL MEDICINE

## 2021-08-09 PROCEDURE — 84153 ASSAY OF PSA TOTAL: CPT | Performed by: INTERNAL MEDICINE

## 2021-08-09 PROCEDURE — 99396 PREV VISIT EST AGE 40-64: CPT | Mod: S$GLB,,, | Performed by: INTERNAL MEDICINE

## 2021-08-09 PROCEDURE — 1159F PR MEDICATION LIST DOCUMENTED IN MEDICAL RECORD: ICD-10-PCS | Mod: CPTII,S$GLB,, | Performed by: INTERNAL MEDICINE

## 2021-08-09 PROCEDURE — 83036 HEMOGLOBIN GLYCOSYLATED A1C: CPT | Performed by: INTERNAL MEDICINE

## 2021-08-09 PROCEDURE — 93005 ELECTROCARDIOGRAM TRACING: CPT | Mod: S$GLB,,, | Performed by: INTERNAL MEDICINE

## 2021-08-09 PROCEDURE — 1159F MED LIST DOCD IN RCRD: CPT | Mod: CPTII,S$GLB,, | Performed by: INTERNAL MEDICINE

## 2021-08-09 PROCEDURE — 1126F PR PAIN SEVERITY QUANTIFIED, NO PAIN PRESENT: ICD-10-PCS | Mod: CPTII,S$GLB,, | Performed by: INTERNAL MEDICINE

## 2021-08-09 PROCEDURE — 3078F DIAST BP <80 MM HG: CPT | Mod: CPTII,S$GLB,, | Performed by: INTERNAL MEDICINE

## 2021-08-09 PROCEDURE — 3078F PR MOST RECENT DIASTOLIC BLOOD PRESSURE < 80 MM HG: ICD-10-PCS | Mod: CPTII,S$GLB,, | Performed by: INTERNAL MEDICINE

## 2021-08-09 PROCEDURE — 84443 ASSAY THYROID STIM HORMONE: CPT | Performed by: INTERNAL MEDICINE

## 2021-08-09 PROCEDURE — 80061 LIPID PANEL: CPT | Performed by: INTERNAL MEDICINE

## 2021-08-09 PROCEDURE — 71271 CT THORAX LUNG CANCER SCR C-: CPT | Mod: TC

## 2021-08-09 PROCEDURE — 3074F PR MOST RECENT SYSTOLIC BLOOD PRESSURE < 130 MM HG: ICD-10-PCS | Mod: CPTII,S$GLB,, | Performed by: INTERNAL MEDICINE

## 2021-08-09 PROCEDURE — 1126F AMNT PAIN NOTED NONE PRSNT: CPT | Mod: CPTII,S$GLB,, | Performed by: INTERNAL MEDICINE

## 2021-08-09 PROCEDURE — 71271 CT THORAX LUNG CANCER SCR C-: CPT | Mod: 26,,, | Performed by: RADIOLOGY

## 2021-08-09 RX ORDER — TRIAMCINOLONE ACETONIDE 1 MG/G
CREAM TOPICAL 2 TIMES DAILY
Qty: 45 G | Refills: 1 | Status: SHIPPED | OUTPATIENT
Start: 2021-08-09 | End: 2022-08-15 | Stop reason: SDUPTHER

## 2021-09-15 ENCOUNTER — TELEPHONE (OUTPATIENT)
Dept: ENDOSCOPY | Facility: HOSPITAL | Age: 56
End: 2021-09-15

## 2021-10-04 ENCOUNTER — TELEPHONE (OUTPATIENT)
Dept: ENDOSCOPY | Facility: HOSPITAL | Age: 56
End: 2021-10-04

## 2021-10-04 DIAGNOSIS — Z01.818 PRE-OP TESTING: Primary | ICD-10-CM

## 2021-10-22 ENCOUNTER — TELEPHONE (OUTPATIENT)
Dept: FAMILY MEDICINE | Facility: CLINIC | Age: 56
End: 2021-10-22

## 2021-11-08 ENCOUNTER — TELEPHONE (OUTPATIENT)
Dept: ENDOSCOPY | Facility: HOSPITAL | Age: 56
End: 2021-11-08
Payer: COMMERCIAL

## 2021-11-08 ENCOUNTER — ANESTHESIA EVENT (OUTPATIENT)
Dept: ENDOSCOPY | Facility: HOSPITAL | Age: 56
End: 2021-11-08
Payer: COMMERCIAL

## 2021-11-08 ENCOUNTER — ANESTHESIA (OUTPATIENT)
Dept: ENDOSCOPY | Facility: HOSPITAL | Age: 56
End: 2021-11-08
Payer: COMMERCIAL

## 2021-11-08 ENCOUNTER — HOSPITAL ENCOUNTER (OUTPATIENT)
Facility: HOSPITAL | Age: 56
Discharge: HOME | End: 2021-11-08
Attending: INTERNAL MEDICINE | Admitting: INTERNAL MEDICINE
Payer: COMMERCIAL

## 2021-11-08 VITALS
OXYGEN SATURATION: 96 % | DIASTOLIC BLOOD PRESSURE: 83 MMHG | HEART RATE: 59 BPM | RESPIRATION RATE: 15 BRPM | WEIGHT: 223 LBS | HEIGHT: 70 IN | TEMPERATURE: 98 F | BODY MASS INDEX: 31.92 KG/M2 | SYSTOLIC BLOOD PRESSURE: 132 MMHG

## 2021-11-08 DIAGNOSIS — Z80.0 FAMILY HISTORY OF COLON CANCER IN FATHER: ICD-10-CM

## 2021-11-08 DIAGNOSIS — D12.6 COLON ADENOMAS: Primary | ICD-10-CM

## 2021-11-08 DIAGNOSIS — D12.6 COLON ADENOMAS: ICD-10-CM

## 2021-11-08 PROCEDURE — E9220 PRA ENDO ANESTHESIA: HCPCS | Mod: 33,,, | Performed by: NURSE ANESTHETIST, CERTIFIED REGISTERED

## 2021-11-08 PROCEDURE — 27201012 HC FORCEPS, HOT/COLD, DISP: Performed by: INTERNAL MEDICINE

## 2021-11-08 PROCEDURE — 63600175 PHARM REV CODE 636 W HCPCS: Performed by: NURSE ANESTHETIST, CERTIFIED REGISTERED

## 2021-11-08 PROCEDURE — 45385 COLONOSCOPY W/LESION REMOVAL: CPT | Performed by: INTERNAL MEDICINE

## 2021-11-08 PROCEDURE — 88305 TISSUE EXAM BY PATHOLOGIST: CPT | Mod: 26,,, | Performed by: PATHOLOGY

## 2021-11-08 PROCEDURE — 25000003 PHARM REV CODE 250: Performed by: NURSE ANESTHETIST, CERTIFIED REGISTERED

## 2021-11-08 PROCEDURE — 45380 COLONOSCOPY AND BIOPSY: CPT | Performed by: INTERNAL MEDICINE

## 2021-11-08 PROCEDURE — 27201089 HC SNARE, DISP (ANY): Performed by: INTERNAL MEDICINE

## 2021-11-08 PROCEDURE — E9220 PRA ENDO ANESTHESIA: ICD-10-PCS | Mod: 33,,, | Performed by: NURSE ANESTHETIST, CERTIFIED REGISTERED

## 2021-11-08 PROCEDURE — 45385 COLONOSCOPY W/LESION REMOVAL: CPT | Mod: 33,,, | Performed by: INTERNAL MEDICINE

## 2021-11-08 PROCEDURE — 45380 PR COLONOSCOPY,BIOPSY: ICD-10-PCS | Mod: 59,,, | Performed by: INTERNAL MEDICINE

## 2021-11-08 PROCEDURE — 45385 PR COLONOSCOPY,REMV LESN,SNARE: ICD-10-PCS | Mod: 33,,, | Performed by: INTERNAL MEDICINE

## 2021-11-08 PROCEDURE — 37000008 HC ANESTHESIA 1ST 15 MINUTES: Performed by: INTERNAL MEDICINE

## 2021-11-08 PROCEDURE — 88305 TISSUE EXAM BY PATHOLOGIST: CPT | Performed by: PATHOLOGY

## 2021-11-08 PROCEDURE — 37000009 HC ANESTHESIA EA ADD 15 MINS: Performed by: INTERNAL MEDICINE

## 2021-11-08 PROCEDURE — 45380 COLONOSCOPY AND BIOPSY: CPT | Mod: 59,,, | Performed by: INTERNAL MEDICINE

## 2021-11-08 PROCEDURE — 88305 TISSUE EXAM BY PATHOLOGIST: ICD-10-PCS | Mod: 26,,, | Performed by: PATHOLOGY

## 2021-11-08 RX ORDER — SODIUM CHLORIDE 9 MG/ML
INJECTION, SOLUTION INTRAVENOUS CONTINUOUS
Status: DISCONTINUED | OUTPATIENT
Start: 2021-11-08 | End: 2021-11-08 | Stop reason: HOSPADM

## 2021-11-08 RX ORDER — LIDOCAINE HYDROCHLORIDE 20 MG/ML
INJECTION INTRAVENOUS
Status: DISCONTINUED | OUTPATIENT
Start: 2021-11-08 | End: 2021-11-08

## 2021-11-08 RX ORDER — PROPOFOL 10 MG/ML
VIAL (ML) INTRAVENOUS CONTINUOUS PRN
Status: DISCONTINUED | OUTPATIENT
Start: 2021-11-08 | End: 2021-11-08

## 2021-11-08 RX ADMIN — PROPOFOL 30 MG: 10 INJECTION, EMULSION INTRAVENOUS at 08:11

## 2021-11-08 RX ADMIN — Medication 150 MCG/KG/MIN: at 08:11

## 2021-11-08 RX ADMIN — SODIUM CHLORIDE: 0.9 INJECTION, SOLUTION INTRAVENOUS at 08:11

## 2021-11-08 RX ADMIN — PROPOFOL 50 MG: 10 INJECTION, EMULSION INTRAVENOUS at 08:11

## 2021-11-08 RX ADMIN — LIDOCAINE HYDROCHLORIDE 75 MG: 20 INJECTION, SOLUTION INTRAVENOUS at 08:11

## 2021-11-19 LAB
FINAL PATHOLOGIC DIAGNOSIS: NORMAL
Lab: NORMAL

## 2022-02-11 ENCOUNTER — PATIENT MESSAGE (OUTPATIENT)
Dept: HEMATOLOGY/ONCOLOGY | Facility: CLINIC | Age: 57
End: 2022-02-11
Payer: COMMERCIAL

## 2022-02-15 ENCOUNTER — OFFICE VISIT (OUTPATIENT)
Dept: HEMATOLOGY/ONCOLOGY | Facility: CLINIC | Age: 57
End: 2022-02-15
Payer: COMMERCIAL

## 2022-02-15 ENCOUNTER — PATIENT MESSAGE (OUTPATIENT)
Dept: HEMATOLOGY/ONCOLOGY | Facility: CLINIC | Age: 57
End: 2022-02-15

## 2022-02-15 DIAGNOSIS — Z80.0 FAMILY HISTORY OF COLON CANCER: ICD-10-CM

## 2022-02-15 DIAGNOSIS — Z71.83 ENCOUNTER FOR NONPROCREATIVE GENETIC COUNSELING: Primary | ICD-10-CM

## 2022-02-15 DIAGNOSIS — D12.6 COLON ADENOMAS: ICD-10-CM

## 2022-02-15 PROCEDURE — 1159F PR MEDICATION LIST DOCUMENTED IN MEDICAL RECORD: ICD-10-PCS | Mod: CPTII,S$GLB,, | Performed by: NURSE PRACTITIONER

## 2022-02-15 PROCEDURE — 99204 PR OFFICE/OUTPT VISIT, NEW, LEVL IV, 45-59 MIN: ICD-10-PCS | Mod: S$GLB,,, | Performed by: NURSE PRACTITIONER

## 2022-02-15 PROCEDURE — 1159F MED LIST DOCD IN RCRD: CPT | Mod: CPTII,S$GLB,, | Performed by: NURSE PRACTITIONER

## 2022-02-15 PROCEDURE — 99999 PR PBB SHADOW E&M-EST. PATIENT-LVL III: CPT | Mod: PBBFAC,,, | Performed by: NURSE PRACTITIONER

## 2022-02-15 PROCEDURE — 99204 OFFICE O/P NEW MOD 45 MIN: CPT | Mod: S$GLB,,, | Performed by: NURSE PRACTITIONER

## 2022-02-15 PROCEDURE — 99999 PR PBB SHADOW E&M-EST. PATIENT-LVL III: ICD-10-PCS | Mod: PBBFAC,,, | Performed by: NURSE PRACTITIONER

## 2022-02-15 NOTE — PROGRESS NOTES
"Cancer Genetics  Hereditary/High Risk Clinic  Department of Hematology and Oncology  [facility]    Date of Service:  2/15/22  Provider:  Anmol Malik DNP    Patient Information  Name: Porter Rice  : 1965  MRN: [id number]     Referring Provider    Adan Scott MD  [address] [place] [address]    SUBJECTIVE      Chief Complaint: Genetic Evaluation    History of Present Illness (HPI):  Porter Rice ("Porter"), 57 y.o., assigned male sex at birth, is new to the [facility] and to me.  He was referred by [facility] for genetic cancer risk assessment given his history of colon polyps and family history of colon cancer.    Focused Medical History     Colonoscopy Date Finding Size Characteristic Location Pathology   2017 Polyp 1 mm Sessile  Cecum Adenomatous polyp   2017 Polyp 1 mm Sessile  Ascending colon Adenomatous polyp   2017 Polyp 10 mm Pedunculated Descending colon Mixed villous and adenomatous polyp   2017 Polyp 1 mm Sessile  Rectum Adenomatous polyp   2021 Polyp 2 mm Sessile  Transverse colon Polypoid colonic mucosa with benign lymphoid aggregate   2021 Polyp 1 mm Flat Rectum Hyperplastic polyp   2021 Polyp 1 mm Flat Rectum Hyperplastic polyp      Germline cancer genetic testing:  No   Cancer:     No   Colon polyp:    Yes - see above  o History of 2 colonoscopies only  o Next screening colonoscopy due in 2024 according to 2021 colonoscopy report   Other benign tumor/mass:  Yes   o Lung nodule, longstanding -- former cigarette smoker (quit in ), still occasionally smokes a cigar   Pancreatitis:    No    Chemoprevention Use   Never used    Ancestry   Ashkenazi Confucianism ancestry:    No    Focused Family History   Consanguinity in ancestors:    No   Germline cancer genetic testing    in blood relatives:    No    Family Cancer Pedigree       Other than noted, no known history of " cancer in:  maternal first cousins or maternal extended family.   Other than noted, no known family history of colon polyps.   Unaware of or limited knowledge of medical history of:  paternal extended family.    Review of Systems   See HPI.       OBJECTIVE      Past Medical History:   Diagnosis Date    Colon polyps      Patient Active Problem List    Diagnosis Date Noted    Lung nodule < 6cm on CT 08/09/2021    Elevated glucose level 08/09/2021    Hyperlipidemia 08/09/2021    Vitamin D deficiency 08/26/2019    Villous adenoma of colon 07/26/2017    Family history of colon cancer in father 07/26/2017    Encounter for screening colonoscopy 05/24/2017      Records Reviewed:  Any pertinent, available Procedures and Pathology reports in both [facility] and [facility].    Physical Exam  Very pleasant patient.  Unaccompanied.  Vitals signs:  Reviewed:  Vitals:    02/15/22 1227   PainSc: 0-No pain   Distress score:  Reviewed: (1/10).  Constitutional      Appearance:  He appears well developed and well nourished. No distress.   Pulmonary     Effort:  Pulmonary effort is normal.   Neurological     Mental Status:  He is alert and oriented.     Coordination:  Coordination is normal.   Psychiatric         Mood and Affect:  He has a normal mood and affect.     Thought Content:  Thought content is normal.         Speech:  Speech is normal.     Behavior:  Behavior is normal.     Judgment:  Judgment is normal.   Genetics-specific     It is my assessment that the patient is ready to proceed with cancer-genetic testing from a psychosocial perspective.    COUNSELING      Causes of cancer    Germline cancer genetic testing is the testing of genes associated with cancer, known as cancer susceptibility genes.  Just as these genes are inherited from parents, mutations in these genes can be inherited, as well.  A mutation in a cancer susceptibility gene adversely affects the gene's ability to prevent cancer;  therefore, carriers of cancer susceptibility gene mutations may be at increased risk for certain cancers.    Only a small percentage of cancers are caused by a cancer susceptibility gene mutation, meaning the cancer is genetic/hereditary; rather, most cancers are sporadic.  Causes of sporadic cancers may include environmental risk factors, lifestyle risk factors, and non-modifiable risk factors.  It is important to note that members of a family often share not only their genetics but also risk factors including environmental and lifestyle risk factors, so cancers can be familial.    Potential results of genetic testing, and their implications    Potential results of genetic testing include positive, negative, and variant of unknown significance (VUS).     A positive result indicates the presence of at least one clinically significant mutation, and the patient's associated cancer risks vary depending upon the cancer susceptibility gene(s) in which there is/are a mutation(s).  With a positive result, in some cases, depending upon the specific result and the patient's clinical history, modified risk management may be recommended, including measures for risk reduction and/or surveillance; however, modified management is not always an option.     A negative result indicates that no clinically significant mutations were identified in the gene(s) tested.   A VUS indicates that there is not presently enough data for the laboratory to make a determination as to whether the variant is clinically significant.  VUSs are not typically acted upon clinically.      The ability to interpret the meaning of a negative genetic testing result in genes associated with cancer with which the patient has not personally been affected, when done prior to testing the appropriate affected relative(s), is significantly limited.  A negative result in the patient does not indicate that he cannot develop cancer, and, in fact, the patient may  even be at increased risk for cancer based on shared risk factors with affected relatives.      Modified management may also be recommended, even with a result of no or unknown significance, based upon risk assessment that incorporates the family history.      Genetic mutation inheritance    If Porter tests positive for a mutation, his first-degree relatives would each have a 50% chance of having the same mutation, and other, more distantly related blood-relatives would also be at risk of having the same mutation.       Genetic discrimination    The Genetic Information Nondiscrimination Act (KAVITHA) is U[place] federal legislation that provides some protections against use of an individual's genetic information by their health insurer and by their employer.  Title I of KAVITHA prohibits most health insurers from utilizing an individual's genetic information to make decisions regarding insurance eligibility or premium charges.  Title II of KAVITHA prohibits covered entities, including employers, from requesting the genetic information of employees and applicants.  KAVITHA does not protect individuals from genetic discrimination toward health insurance obtained through a job with the  or through the Federal Employees Health Benefits Plan; from genetic discrimination by employers with fewer than 15 employees or if employed by the ; or from genetic discrimination by any other type of policies/entities, including but not limited to life insurance, disability insurance, long-term care insurance,  benefits, and Citizen of [place] [employer] benefits.     Genetic testing logistics    An outside laboratory would perform the testing after a blood sample is collected here at the [facility] or a saliva sample is collected by the patient at home.  With genetic testing, there is a potential for the patient to incur out-of-pocket costs.  Results can take several weeks.  Post-test genetic counseling can be conducted  once the genetic testing results are available.     Assessment/Plan    Based on the information provided by Porter, he meets current criteria for genetic testing of colorectal cancer susceptibility genes according to current clinical guidelines.  I would recommend we also test colorectal polyposis susceptibility genes and consider testing prostate cancer susceptibility genes as well.    Offered Porter germline cancer-genetic testing at this time versus deferring testing at this time or declining testing altogether.  Patient elects to defer genetic testing for hereditary cancer syndromes at this time, citing concern for potential genetic discrimination and wanting to first discuss with his wife, and was advised to notify me if he desires to proceed in the future; he has my contact information.    Questions were encouraged and answered to the patient's satisfaction, and he verbalized understanding of information and agreement with the plan.       ASSESSMENT/PLAN      Porter was seen today for genetic evaluation.    Diagnoses and all orders for this visit:    Encounter for nonprocreative genetic counseling        - Genetic cancer risk assessment and pre-test cancer genetic counseling were conducted.  Based on the information provided by Porter, he meets current criteria for genetic testing of colorectal cancer susceptibility genes according to current clinical guidelines.  I would recommend we also test colorectal polyposis susceptibility genes and consider testing prostate cancer susceptibility genes as well.  Patient elects to defer genetic testing for hereditary cancer syndromes at this time, citing concern for potential genetic discrimination and wanting to first discuss with his wife, and was advised to notify me if he desires to proceed in the future; he has my contact information.     Colon adenomas; Family history of colon cancer        - As above.        - Ambulatory referral/consult to Genetics:  Consult  completed today.    Follow-up:  Follow up in about 1 year (around 2/15/2023) for re-discussion regarding genetic testing, if the patient does not reach out sooner to test.    Approximately 40 minutes were spent face-to-face with the patient.  Approximately 52 minutes in total were spent on this encounter, which includes face-to-face time and non-face-to-face time preparing to see the patient (e.g., review of tests), obtaining and/or reviewing separately obtained history, documenting clinical information in the electronic or other health record, independently interpreting results (not separately reported) and communicating results to the patient/family/caregiver, or care coordination (not separately reported).     Anmol Malik, DNP, APRN, FNP-BC, AOCNP  Nurse Practitioner, Hereditary/High Risk Clinic  Hematology/Oncology, [facility]

## 2022-05-24 DIAGNOSIS — Z00.00 ROUTINE GENERAL MEDICAL EXAMINATION AT A HEALTH CARE FACILITY: Primary | ICD-10-CM

## 2022-05-24 DIAGNOSIS — R91.8 OTHER NONSPECIFIC ABNORMAL FINDING OF LUNG FIELD: ICD-10-CM

## 2022-08-15 ENCOUNTER — HOSPITAL ENCOUNTER (OUTPATIENT)
Dept: CARDIOLOGY | Facility: CLINIC | Age: 57
Discharge: HOME | End: 2022-08-15
Payer: COMMERCIAL

## 2022-08-15 ENCOUNTER — CLINICAL SUPPORT (OUTPATIENT)
Dept: INTERNAL MEDICINE | Facility: CLINIC | Age: 57
End: 2022-08-15
Payer: COMMERCIAL

## 2022-08-15 ENCOUNTER — HOSPITAL ENCOUNTER (OUTPATIENT)
Dept: RADIOLOGY | Facility: HOSPITAL | Age: 57
Discharge: HOME | End: 2022-08-15
Attending: INTERNAL MEDICINE
Payer: COMMERCIAL

## 2022-08-15 ENCOUNTER — OFFICE VISIT (OUTPATIENT)
Dept: INTERNAL MEDICINE | Facility: CLINIC | Age: 57
End: 2022-08-15
Payer: COMMERCIAL

## 2022-08-15 VITALS
OXYGEN SATURATION: 99 % | SYSTOLIC BLOOD PRESSURE: 112 MMHG | HEART RATE: 64 BPM | BODY MASS INDEX: 32.76 KG/M2 | DIASTOLIC BLOOD PRESSURE: 80 MMHG | HEIGHT: 70 IN | WEIGHT: 228.81 LBS

## 2022-08-15 DIAGNOSIS — R91.8 OTHER NONSPECIFIC ABNORMAL FINDING OF LUNG FIELD: ICD-10-CM

## 2022-08-15 DIAGNOSIS — K80.20 CALCULUS OF GALLBLADDER WITHOUT CHOLECYSTITIS WITHOUT OBSTRUCTION: ICD-10-CM

## 2022-08-15 DIAGNOSIS — F17.210 NICOTINE DEPENDENCE, CIGARETTES, UNCOMPLICATED: ICD-10-CM

## 2022-08-15 DIAGNOSIS — E78.5 HYPERLIPIDEMIA, UNSPECIFIED HYPERLIPIDEMIA TYPE: ICD-10-CM

## 2022-08-15 DIAGNOSIS — Z00.00 ROUTINE PHYSICAL EXAMINATION: Primary | ICD-10-CM

## 2022-08-15 DIAGNOSIS — Z00.00 ROUTINE GENERAL MEDICAL EXAMINATION AT A HEALTH CARE FACILITY: Primary | ICD-10-CM

## 2022-08-15 DIAGNOSIS — R73.09 ELEVATED GLUCOSE LEVEL: ICD-10-CM

## 2022-08-15 DIAGNOSIS — Z00.00 ROUTINE GENERAL MEDICAL EXAMINATION AT A HEALTH CARE FACILITY: ICD-10-CM

## 2022-08-15 LAB
ALBUMIN SERPL BCP-MCNC: 3.9 G/DL (ref 3.5–5.2)
ALP SERPL-CCNC: 73 U/L (ref 55–135)
ALT SERPL W/O P-5'-P-CCNC: 29 U/L (ref 10–44)
ANION GAP SERPL CALC-SCNC: 9 MMOL/L (ref 8–16)
AST SERPL-CCNC: 24 U/L (ref 10–40)
BILIRUB SERPL-MCNC: 0.5 MG/DL (ref 0.1–1)
BUN SERPL-MCNC: 16 MG/DL (ref 6–20)
CALCIUM SERPL-MCNC: 10 MG/DL (ref 8.7–10.5)
CHLORIDE SERPL-SCNC: 105 MMOL/L (ref 95–110)
CHOLEST SERPL-MCNC: 267 MG/DL (ref 120–199)
CHOLEST/HDLC SERPL: 4.9 {RATIO} (ref 2–5)
CO2 SERPL-SCNC: 28 MMOL/L (ref 23–29)
COMPLEXED PSA SERPL-MCNC: 0.37 NG/ML (ref 0–4)
CREAT SERPL-MCNC: 0.9 MG/DL (ref 0.5–1.4)
ERYTHROCYTE [DISTWIDTH] IN BLOOD BY AUTOMATED COUNT: 12.1 % (ref 11.5–14.5)
EST. GFR  (NO RACE VARIABLE): >60 ML/MIN/1.73 M^2
ESTIMATED AVG GLUCOSE: 148 MG/DL (ref 68–131)
GLUCOSE SERPL-MCNC: 154 MG/DL (ref 70–110)
HBA1C MFR BLD: 6.8 % (ref 4–5.6)
HCT VFR BLD AUTO: 46 % (ref 40–54)
HDLC SERPL-MCNC: 54 MG/DL (ref 40–75)
HDLC SERPL: 20.2 % (ref 20–50)
HGB BLD-MCNC: 15.1 G/DL (ref 14–18)
LDLC SERPL CALC-MCNC: 183 MG/DL (ref 63–159)
MCH RBC QN AUTO: 30.2 PG (ref 27–31)
MCHC RBC AUTO-ENTMCNC: 32.8 G/DL (ref 32–36)
MCV RBC AUTO: 92 FL (ref 82–98)
NONHDLC SERPL-MCNC: 213 MG/DL
PLATELET # BLD AUTO: 175 K/UL (ref 150–450)
PMV BLD AUTO: 11.6 FL (ref 9.2–12.9)
POTASSIUM SERPL-SCNC: 4.2 MMOL/L (ref 3.5–5.1)
PROT SERPL-MCNC: 7 G/DL (ref 6–8.4)
RBC # BLD AUTO: 5 M/UL (ref 4.6–6.2)
SODIUM SERPL-SCNC: 142 MMOL/L (ref 136–145)
TRIGL SERPL-MCNC: 150 MG/DL (ref 30–150)
TSH SERPL DL<=0.005 MIU/L-ACNC: 1.7 UIU/ML (ref 0.4–4)
WBC # BLD AUTO: 6.49 K/UL (ref 3.9–12.7)

## 2022-08-15 PROCEDURE — 93005 ELECTROCARDIOGRAM TRACING: CPT | Mod: S$GLB,,, | Performed by: INTERNAL MEDICINE

## 2022-08-15 PROCEDURE — 71271 CT THORAX LUNG CANCER SCR C-: CPT | Mod: 26,,, | Performed by: RADIOLOGY

## 2022-08-15 PROCEDURE — 3008F BODY MASS INDEX DOCD: CPT | Mod: CPTII,S$GLB,, | Performed by: INTERNAL MEDICINE

## 2022-08-15 PROCEDURE — 3079F DIAST BP 80-89 MM HG: CPT | Mod: CPTII,S$GLB,, | Performed by: INTERNAL MEDICINE

## 2022-08-15 PROCEDURE — 99396 PREV VISIT EST AGE 40-64: CPT | Mod: S$GLB,,, | Performed by: INTERNAL MEDICINE

## 2022-08-15 PROCEDURE — 93010 EKG 12-LEAD: ICD-10-PCS | Mod: S$GLB,,, | Performed by: INTERNAL MEDICINE

## 2022-08-15 PROCEDURE — 3008F PR BODY MASS INDEX (BMI) DOCUMENTED: ICD-10-PCS | Mod: CPTII,S$GLB,, | Performed by: INTERNAL MEDICINE

## 2022-08-15 PROCEDURE — 3044F HG A1C LEVEL LT 7.0%: CPT | Mod: CPTII,S$GLB,, | Performed by: INTERNAL MEDICINE

## 2022-08-15 PROCEDURE — 71271 CT CHEST LUNG SCREENING LOW DOSE: ICD-10-PCS | Mod: 26,,, | Performed by: RADIOLOGY

## 2022-08-15 PROCEDURE — 99396 PR PREVENTIVE VISIT,EST,40-64: ICD-10-PCS | Mod: S$GLB,,, | Performed by: INTERNAL MEDICINE

## 2022-08-15 PROCEDURE — 3079F PR MOST RECENT DIASTOLIC BLOOD PRESSURE 80-89 MM HG: ICD-10-PCS | Mod: CPTII,S$GLB,, | Performed by: INTERNAL MEDICINE

## 2022-08-15 PROCEDURE — 84443 ASSAY THYROID STIM HORMONE: CPT | Performed by: INTERNAL MEDICINE

## 2022-08-15 PROCEDURE — 3044F PR MOST RECENT HEMOGLOBIN A1C LEVEL <7.0%: ICD-10-PCS | Mod: CPTII,S$GLB,, | Performed by: INTERNAL MEDICINE

## 2022-08-15 PROCEDURE — 85027 COMPLETE CBC AUTOMATED: CPT | Performed by: INTERNAL MEDICINE

## 2022-08-15 PROCEDURE — 3074F PR MOST RECENT SYSTOLIC BLOOD PRESSURE < 130 MM HG: ICD-10-PCS | Mod: CPTII,S$GLB,, | Performed by: INTERNAL MEDICINE

## 2022-08-15 PROCEDURE — 80061 LIPID PANEL: CPT | Performed by: INTERNAL MEDICINE

## 2022-08-15 PROCEDURE — 83036 HEMOGLOBIN GLYCOSYLATED A1C: CPT | Performed by: INTERNAL MEDICINE

## 2022-08-15 PROCEDURE — 99999 PR PBB SHADOW E&M-EST. PATIENT-LVL IV: CPT | Mod: PBBFAC,,, | Performed by: INTERNAL MEDICINE

## 2022-08-15 PROCEDURE — 1160F PR REVIEW ALL MEDS BY PRESCRIBER/CLIN PHARMACIST DOCUMENTED: ICD-10-PCS | Mod: CPTII,S$GLB,, | Performed by: INTERNAL MEDICINE

## 2022-08-15 PROCEDURE — 71271 CT THORAX LUNG CANCER SCR C-: CPT | Mod: TC

## 2022-08-15 PROCEDURE — 1160F RVW MEDS BY RX/DR IN RCRD: CPT | Mod: CPTII,S$GLB,, | Performed by: INTERNAL MEDICINE

## 2022-08-15 PROCEDURE — 93005 EKG 12-LEAD: ICD-10-PCS | Mod: S$GLB,,, | Performed by: INTERNAL MEDICINE

## 2022-08-15 PROCEDURE — 80053 COMPREHEN METABOLIC PANEL: CPT | Performed by: INTERNAL MEDICINE

## 2022-08-15 PROCEDURE — 84153 ASSAY OF PSA TOTAL: CPT | Performed by: INTERNAL MEDICINE

## 2022-08-15 PROCEDURE — 1159F MED LIST DOCD IN RCRD: CPT | Mod: CPTII,S$GLB,, | Performed by: INTERNAL MEDICINE

## 2022-08-15 PROCEDURE — 1159F PR MEDICATION LIST DOCUMENTED IN MEDICAL RECORD: ICD-10-PCS | Mod: CPTII,S$GLB,, | Performed by: INTERNAL MEDICINE

## 2022-08-15 PROCEDURE — 99999 PR PBB SHADOW E&M-EST. PATIENT-LVL IV: ICD-10-PCS | Mod: PBBFAC,,, | Performed by: INTERNAL MEDICINE

## 2022-08-15 PROCEDURE — 93010 ELECTROCARDIOGRAM REPORT: CPT | Mod: S$GLB,,, | Performed by: INTERNAL MEDICINE

## 2022-08-15 PROCEDURE — 3074F SYST BP LT 130 MM HG: CPT | Mod: CPTII,S$GLB,, | Performed by: INTERNAL MEDICINE

## 2022-08-15 RX ORDER — TRIAMCINOLONE ACETONIDE 1 MG/G
CREAM TOPICAL 2 TIMES DAILY
Qty: 45 G | Refills: 1 | Status: SHIPPED | OUTPATIENT
Start: 2022-08-15 | End: 2023-06-12 | Stop reason: SDUPTHER

## 2022-08-15 NOTE — LETTER
August 15, 2022    Porter Rice  [address]  [place] [address]             [facility]  [address]  [facility] [address]  Phone: [phone]  Fax: [phone] Dear Mr. Rice:        Thank you for allowing me to serve you and perform your Executive Health exam on 8/15/2022.  This letter will serve a brief summary of the history, physical findings, and laboratory/studies performed and recommendations at that time.    Reason for Visit: Executive Health Preventive Physical Examination    Subjective:       Patient ID: Porter Rice is a 57 y.o. male.    Chief Complaint: Annual Exam    Patient here for executive health physical.  Overall doing fairly well.  He had a short vacation weekend with his wife and did eat a bit more than usual.  We discussed this because his cholesterol jumped up significantly but is A1c is also up a little as well.  He admits he is not following a strict diet but thinks he can improve that and we would like to watch his sugar salt and fat intake and reassess his labs in about 3 months.    He denies chest pains or shortness of breath.  No cough or wheeze.  Other labs including thyroid, PSA, chemistry and blood count are stable.  EKG shows stable sinus bradycardia and otherwise no acute changes.  Stable compared to last year.    Chest CT shows no worrisome lung findings or changes.  Positive cholelithiasis.  Discussed gallstones with the patient.  Recommendation for repeat at 1 year was also discussed an ordered.  The he will continue to have colonoscopy for increased risk of colon cancer.  Repeat in 3 years discussed.  He did see a .    Review of Systems   Constitutional: Negative for chills, fatigue and fever.   HENT: Negative for nosebleeds and trouble swallowing.    Eyes: Negative for pain and visual disturbance.   Respiratory: Negative for cough, shortness of breath and wheezing.    Cardiovascular: Negative for chest pain and  palpitations.   Gastrointestinal: Negative for abdominal pain, constipation, diarrhea, nausea and vomiting.   Genitourinary: Negative for difficulty urinating and hematuria.   Musculoskeletal: Negative for arthralgias, back pain and neck pain.   Integumentary:  Negative for rash.   Neurological: Negative for dizziness and headaches.   Hematological: Does not bruise/bleed easily.   Psychiatric/Behavioral: Negative for dysphoric mood and sleep disturbance.           Past Medical History:   Diagnosis Date    Colon polyps      Past Surgical History:   Procedure Laterality Date    COLONOSCOPY N/A 5/24/2017    Procedure: COLONOSCOPY;  Surgeon: Adan Scott MD;  Location: [facility] ([address] [place] [address]);  Service: Endoscopy;  Laterality: N/A;  please schedule as VERY FIRST CASE OF THE DAY with me    COLONOSCOPY N/A 11/8/2021    Procedure: COLONOSCOPY;  Surgeon: Adan Scott MD;  Location: [facility] ([address] [place] [address]);  Service: Endoscopy;  Laterality: N/A;  last colonoscopy on 5/24/17-repeat in 3 yrs per Dr Scott. order placed      COVID [facility] at [facility] on 11/5-GT      Patient Active Problem List   Diagnosis    Encounter for screening colonoscopy    Villous adenoma of colon    Family history of colon cancer in father    Vitamin D deficiency    Lung nodule < 6cm on CT    Elevated glucose level    Hyperlipidemia        Objective:      Physical Exam  Constitutional:       General: He is not in acute distress.     Appearance: He is well-developed. He is obese.   HENT:      Head: Normocephalic and atraumatic.      Right Ear: Tympanic membrane, ear canal and external ear normal.      Left Ear: Tympanic membrane, ear canal and external ear normal.      Mouth/Throat:      Pharynx: No oropharyngeal exudate or posterior oropharyngeal erythema.   Eyes:      General: No scleral icterus.     Conjunctiva/sclera: Conjunctivae normal.      Pupils: Pupils are equal, round, and reactive to light.   Neck:      Thyroid: No  thyromegaly.      Comments: No supraclavicular nodes palpated  Cardiovascular:      Rate and Rhythm: Normal rate and regular rhythm.      Pulses: Normal pulses.      Heart sounds: Normal heart sounds. No murmur heard.  Pulmonary:      Effort: Pulmonary effort is normal.      Breath sounds: Normal breath sounds. No wheezing.   Abdominal:      General: Bowel sounds are normal.      Palpations: Abdomen is soft. There is no mass.      Tenderness: There is no abdominal tenderness.   Genitourinary:     Comments:  deferred  Musculoskeletal:         General: No tenderness.      Cervical back: Normal range of motion and neck supple.      Right lower leg: No edema.      Left lower leg: No edema.   Lymphadenopathy:      Cervical: No cervical adenopathy.   Skin:     Coloration: Skin is not jaundiced or pale.   Neurological:      General: No focal deficit present.      Mental Status: He is alert and oriented to person, place, and time.   Psychiatric:         Mood and Affect: Mood normal.         Behavior: Behavior normal.         Assessment:       Problem List Items Addressed This Visit        Pulmonary    Lung nodule < 6cm on CT    Relevant Orders    CT Chest Lung Screening Low Dose    Basic Metabolic Panel    Cholesterol, Total    Hemoglobin A1C       Cardiac/Vascular    Hyperlipidemia       Endocrine    Elevated glucose level    Relevant Orders    Basic Metabolic Panel    Cholesterol, Total    Hemoglobin A1C      Other Visit Diagnoses     Routine physical examination    -  Primary    Relevant Orders    CT Chest Lung Screening Low Dose    Nicotine dependence, cigarettes, uncomplicated        Relevant Orders    CT Chest Lung Screening Low Dose    Basic Metabolic Panel    Cholesterol, Total    Hemoglobin A1C    Calculus of gallbladder without cholecystitis without obstruction              Plan:         Porter was seen today for annual exam.    Diagnoses and all orders for this visit:    Routine physical examination  -     CT  "Chest Lung Screening Low Dose; Future    Lung nodule < 6cm on CT  -     CT Chest Lung Screening Low Dose; Future  -     Basic Metabolic Panel; Future  -     Cholesterol, Total; Future  -     Hemoglobin A1C; Future    Nicotine dependence, cigarettes, uncomplicated  -     CT Chest Lung Screening Low Dose; Future  -     Basic Metabolic Panel; Future  -     Cholesterol, Total; Future  -     Hemoglobin A1C; Future    Elevated glucose level  -     Basic Metabolic Panel; Future  -     Cholesterol, Total; Future  -     Hemoglobin A1C; Future    Hyperlipidemia, unspecified hyperlipidemia type    Calculus of gallbladder without cholecystitis without obstruction    Other orders  -     triamcinolone acetonide 0.1% (KENALOG) 0.1 % cream; Apply topically 2 (two) times daily.               Continue to work on weight diet and exercise.  Patient information sheets for low-fat and low sugar diets discussed.  Reassess labs in 3 months with a visit.      Portions of this note may have been created with voice recognition software. Occasional "wrong-word" or "sound-a-like" substitutions may have occurred due to the inherent limitations of voice recognition software. Please, read the note carefully and recognize, using context, where substitutions have occurred.       Labs:  Results for orders placed or performed in visit on 08/15/22   Comprehensive metabolic panel   Result Value Ref Range    Sodium 142 136 - 145 mmol/L    Potassium 4.2 3.5 - 5.1 mmol/L    Chloride 105 95 - 110 mmol/L    CO2 28 23 - 29 mmol/L    Glucose 154 (H) 70 - 110 mg/dL    BUN 16 6 - 20 mg/dL    Creatinine 0.9 0.5 - 1.4 mg/dL    Calcium 10.0 8.7 - 10.5 mg/dL    Total Protein 7.0 6.0 - 8.4 g/dL    Albumin 3.9 3.5 - 5.2 g/dL    Total Bilirubin 0.5 0.1 - 1.0 mg/dL    Alkaline Phosphatase 73 55 - 135 U/L    AST 24 10 - 40 U/L    ALT 29 10 - 44 U/L    Anion Gap 9 8 - 16 mmol/L    eGFR >60.0 >60 mL/min/1.73 m^2   CBC Without Differential   Result Value Ref Range    WBC " 6.49 3.90 - 12.70 K/uL    RBC 5.00 4.60 - 6.20 M/uL    Hemoglobin 15.1 14.0 - 18.0 g/dL    Hematocrit 46.0 40.0 - 54.0 %    MCV 92 82 - 98 fL    MCH 30.2 27.0 - 31.0 pg    MCHC 32.8 32.0 - 36.0 g/dL    RDW 12.1 11.5 - 14.5 %    Platelets 175 150 - 450 K/uL    MPV 11.6 9.2 - 12.9 fL   Lipid panel   Result Value Ref Range    Cholesterol 267 (H) 120 - 199 mg/dL    Triglycerides 150 30 - 150 mg/dL    HDL 54 40 - 75 mg/dL    LDL Cholesterol 183.0 (H) 63.0 - 159.0 mg/dL    HDL/Cholesterol Ratio 20.2 20.0 - 50.0 %    Total Cholesterol/HDL Ratio 4.9 2.0 - 5.0    Non-HDL Cholesterol 213 mg/dL   TSH   Result Value Ref Range    TSH 1.696 0.400 - 4.000 uIU/mL   PSA, Screening (every year)   Result Value Ref Range    PSA, Screen 0.37 0.00 - 4.00 ng/mL   Hemoglobin A1c   Result Value Ref Range    Hemoglobin A1C 6.8 (H) 4.0 - 5.6 %    Estimated Avg Glucose 148 (H) 68 - 131 mg/dL        Assessment/Recommendations:  Routine Health Maintenance    At this time, you appear to be in good medical condition.  I look forward to seeing you again next year.  Please contact me should you have any questions or concerns regarding physical findings, or my recommendations.              If you have any questions or concerns, please don't hesitate to call.    Sincerely,        Zeeshan Ramos MD

## 2022-08-15 NOTE — PATIENT INSTRUCTIONS
"Patient Education       Diabetes and Diet   The Basics   Written by the doctors and editors at [facility]   Why is diet important in diabetes? -- Diet is important because it is part of diabetes treatment. Many people need to change what they eat and how much they eat to help treat their diabetes. It is important for people to treat their diabetes so that they:  Keep their blood sugar at or near a normal level  Prevent long-term problems, such as heart or kidney problems, that can happen in people with diabetes  Changing your diet can also help treat obesity, high blood pressure, and high cholesterol. These conditions can affect people with diabetes and can lead to future problems, such as heart attacks or strokes.  Who will work with me to change my diet? -- Your doctor or nurse will work with you to make a food plan to change your diet. They might also recommend that you work with a "dietitian." A dietitian is an expert on food and eating.  Do I need to eat at the same times every day? -- When and how often you should eat depends, in part, on the diabetes medicines you take. For example:  People who take about the same amount of insulin at the same time each day (called a "fixed regimen") should eat meals at the same times. This is also true for people who take pills that increase insulin levels, such as sulfonylureas. Eating meals at the same time every day helps prevent low blood sugar.  People who adjust the dose and timing of their insulin each day (called a "flexible regimen") do not always have to eat meals at the same time. That's because they can time their insulin dose for before they plan to eat, and also adjust the dose for how much they plan to eat.  People who take medicines that don't usually cause low blood sugar, such as metformin, don't have to eat meals at the same time every day.  What do I need to think about when planning what to eat? -- Our bodies break down the food we eat into small pieces " "called carbohydrates, proteins, and fats.  When planning what to eat, people with diabetes need to think about:  Carbohydrates (or "carbs") - Carbohydrates, which are sugars that our bodies use for energy, can raise a person's blood sugar level. Your doctor, nurse, or dietitian will tell you how many carbohydrates you should eat at each meal or snack. Foods that have carbohydrates include:  Bread, pasta, and rice  Vegetables and fruits  Dairy foods  Foods and drinks with added sugar  It is best to get your carbohydrates from fruits, vegetables, whole grains, and low-fat milk. It is best to avoid drinks with added sugar, like soda, juices, and sports drinks.   Protein - Your doctor, nurse, or dietitian will tell you how much protein you should eat each day. It is best to eat lean meats, fish, eggs, beans, peas, soy products, nuts, and seeds.  Fats - The type of fat you eat is more important than the amount of fat. "Saturated" and "trans" fats can increase your risk for heart problems, like a heart attack.  Foods that have saturated fats include meat, butter, cheese, and ice cream.  Foods that have trans fats include processed food with "partially hydrogenated oils" on the ingredient list. This may include fried foods, store bought cookies, muffins, pies, and cakes.  "Monounsaturated" and "polyunsaturated" fats are better for you. Foods with these types of fat include fish, avocado, olive oil, and nuts.  Calories - People need to eat a certain amount of calories each day to keep their weight the same. People who are overweight and want to lose weight need to eat fewer calories each day.  Fiber - Eating foods with a lot of fiber can help control a person's blood sugar level. Foods that have a lot of fiber include apples, green beans, peas, beans, lentils, nuts, oatmeal, and whole grains.  Salt - People who have high blood pressure should not eat foods that contain a lot of salt (also called sodium). People with high " blood pressure should also eat healthy foods, such as fruits, vegetables, and low-fat dairy foods.  Alcohol - Having more than 1 drink (for women) or 2 drinks (for men) a day can raise blood sugar levels. Also, drinks that have fruit juice or soda in them can raise blood sugar levels.  What can I do if I need to lose weight? -- If you need to lose weight, you can:  Exercise - Try to get at least 30 minutes of physical activity a day, most days of the week. Even gentle exercise, like walking, is good for your health. Some people with diabetes need to change their medicine dose before they exercise. They might also need to check their blood sugar levels before and after exercising.  Eat fewer calories - Your doctor, nurse, or dietitian can tell you how many calories you should eat each day in order to lose weight.  If you are worried about your weight, size, or shape, talk with your doctor, nurse, or dietitian. They can help you make changes to improve your health.  Can I eat the same foods as my family? -- Yes. You do not need to eat special foods if you have diabetes. You and your family can eat the same foods. Changing your diet is mostly about eating healthy foods and not eating too much.  What are the other parts of diabetes treatment? -- Besides changing your diet, the other parts of diabetes treatment are:  Exercise  Medicines  Some people with diabetes need to learn how to match their diet and exercise with their medicine dose. For example, people who use insulin might need to choose the dose of insulin they give themselves. To choose their dose, they need to think about:  What they plan to eat at the next meal  How much exercise they plan to do  What their blood sugar level is  If the diet and exercise do not match the medicine dose, a person's blood sugar level can get too low or too high. Blood sugar levels that are too low or too high can cause problems.  All topics are updated as new evidence becomes  available and our peer review process is complete.  This topic retrieved from [employer] on: Sep 21, 2021.  Topic 37540 Version 7.0  Release: 29.4.2 - C29.263  © 2021 UpToDate, Inc. and/or its affiliates. All rights reserved.  Consumer Information Use and Disclaimer   This information is not specific medical advice and does not replace information you receive from your health care provider. This is only a brief summary of general information. It does NOT include all information about conditions, illnesses, injuries, tests, procedures, treatments, therapies, discharge instructions or life-style choices that may apply to you. You must talk with your health care provider for complete information about your health and treatment options. This information should not be used to decide whether or not to accept your health care provider's advice, instructions or recommendations. Only your health care provider has the knowledge and training to provide advice that is right for you. The use of this information is governed by the [employer] End User License Agreement, available at [email or web address]/[email or web address]/wyatt.The use of [employer] content is governed by the [employer] Terms of Use. ©2021 UpToDate, Inc. All rights reserved.  Copyright   © 2021 UpToDate, Inc. and/or its affiliates. All rights reserved.        Patient Education       Diabetes Exchange Diet   About this topic   This diet plan will help you control your blood sugar level. This may also help you have balanced nutrients in your meal. Foods are divided into groups. The groups include starch, fruit, milk, vegetables, meat and meat substitutes, and fat. A serving of food in a group has about the same nutrition as another food in that group. That means that each of the foods have about the same amount of carbohydrates (carbs), protein, fat, and calories as the other. These foods can be exchanged for each other without changing your blood sugar.    "    What will the results be?   You can manage your blood sugar level if you follow this diet. Ask your doctor if you should be checking your blood sugar. Write down your readings and the foods you eat.  What foods are good to eat?   This list is based on the Academy of Nutrition and Dietetics and American Diabetes Association food exchange list.  Starch or bread: One starch exchange has 15 grams of carbs and 80 calories per serving. One serving is equal to:  1 slice of bread (white, whole wheat, rye)  2 slices of reduced calorie or "lite" bread  1/4 (1 ounce/30 grams) bagel  1/2 English muffin  1/2 hamburger bun  3/4 cup (180 grams) cold cereal  1/3 cup (80 grams) cooked brown or white rice  1/2 cup (120 grams) cooked legumes, like dried beans, peas, or lentils  1/3 cup (80 grams) cooked pasta  1/2 cup (120 grams) cup bulgur  1/2 cup (120 grams) corn, sweet potato, or peas  3 ounces (180 grams) baked sweet or white potato  3/4 ounce (24 grams) pretzels  3 cups (720 grams) microwave or hot air popped popcorn (80% light)  Meat and meat substitutes:   Lean protein: This contains 45 calories, 7 grams of protein, and 2 grams of fat per serving. One serving is equal to:  1 ounce (30 grams) poultry, skin removed; chicken, [place] hen, turkey, lean ground turkey or chicken  1 ounce (30 grams) lean beef (flank steak, tenderloin, ground beef with 90% or higher lean/10% or lower fat)*  1 ounce (30 grams) veal, roast or lean chop*  1 ounce (30 grams) lamb, roast or lean chop*  1 ounce (30 grams) pork tenderloin or fresh ham*  1 ounce (30 grams) low-fat cheese (3 grams or less of fat per oz)  1 ounce (30 grams) low-fat luncheon meats (3 grams or less of fat per oz)  1/4 cup cottage cheese  1 ounce (30 grams) salmon, catfish, cod, flounder, tilapia, herring  1 ounce (30 grams) tuna in water, canned  2 small sardines  1 ounce (30 grams) shellfish (clams, scallop, lobster, shrimp)  2 egg whites  1/2 cup (60 grams) egg " substitute  1/2 cup (120 grams) cooked beans (black beans, kidney, chickpeas, or lentils)  *Limit to 1 to 2 times per week.  Medium-fat protein: This contains 75 calories, 7 grams of protein, and 5 grams of fat per serving. One serving is equal to:  1 ounce (30 grams) beef (any prime cut), corned beef, ground beef (85% or lower lean/15% or higher fat)**  1 ounce (30 grams) pork chop; cutlet, ground, or shoulder roast  1 ounce (30 grams) poultry with skin  1 ounce (30 grams) fish; any fried  1 whole egg (medium)**  1 ounce (30 grams) mozzarella cheese  1/4 cup (60 grams) ricotta cheese  4 ounces (120 grams) tofu (note that this is a heart healthy choice)  **Choose these rarely.  Non-starchy Vegetables: One vegetable exchange has 25 calories, 2 grams of protein, and 5 grams of carbs per serving. One serving is equal to:  1/2 cup (120 grams) cooked vegetables (carrots, broccoli, zucchini, cabbage, asparagus)  1 cup (240 grams) raw vegetables or salad greens  1/2 cup (120 mL) vegetable juice  Fruits: One fruit exchange has 15 grams of carbs and 60 calories. One serving is equal to:  1 small apple  1 medium orange or nectarine  1 extra-small banana  1 medium fresh peach  1/2 cup sliced kiwi  1/2 grapefruit  1/2 jayme  1 cup (240 grams) fresh berries (blackberries or raspberries)  1 cup (240 grams) fresh melon (honeydew or cantaloupe) cubes  4 ounces (120 grams) unsweetened juice  Fat-free and low-fat milk: One fat-free or low-fat milk exchange has 100 calories, 12 grams of carbs, and 8 grams of protein per serving. One serving is equal to:  1 cup (240 mL) milk, fat-free or 1% fat  2/3 cup (160 mL) yogurt, plain or artificially sweetened nonfat or low-fat  Fats: One fat exchange has 45 calories and 5 grams of fat per serving. One serving is equal to:  1 teaspoon (5 mL) oil (vegetable, corn, canola, olive)  1 teaspoon (5 grams) butter  1 teaspoon (5 grams) stick margarine  1 teaspoon (5 grams) mayonnaise  1 tablespoon (15  grams) reduced-fat margarine or mayonnaise  1 tablespoon (15 mL) salad dressing  1 tablespoon (15 grams) cream cheese  1 1/2 tablespoons (22.5 grams) reduced-fat cream cheese  1/8 avocado  8 large black olives  10 large stuffed green olives  1 slice banda  Will there be any other care needed?   Your doctor may ask you to make visits to the office to check on your progress. Be sure to keep these visits. If you have been writing down your blood sugar readings, bring it to your visit.  When do I need to call the doctor?   Talk to your doctor or dietitian. They can help you if your favorite food is not on this list. They will also be able to help you with food allergies. Ask them to help you make your own food plan.  Last Reviewed Date   2021-03-29  Consumer Information Use and Disclaimer   This information is not specific medical advice and does not replace information you receive from your health care provider. This is only a brief summary of general information. It does NOT include all information about conditions, illnesses, injuries, tests, procedures, treatments, therapies, discharge instructions or life-style choices that may apply to you. You must talk with your health care provider for complete information about your health and treatment options. This information should not be used to decide whether or not to accept your health care providers advice, instructions or recommendations. Only your health care provider has the knowledge and training to provide advice that is right for you.  Copyright   Copyright © 2021 UpToDate, Inc. and its affiliates and/or licensors. All rights reserved.    Patient Education       Low Cholesterol, Saturated Fat, and Trans Fat Diet   About this topic   Cholesterol, saturated fat, and trans fat are in many foods. These may raise your blood cholesterol levels. If your cholesterol is too high, this can cause health problems in your heart, liver, kidneys, and even your eyes. The key  "to lowering your risk of heart problems is to lower your bad fat intake.  Saturated fats and trans fats are the bad fats. These fats clog your arteries and raise your bad cholesterol. Saturated fats and trans fats are solid fats at room temperature. Saturated fats are animal fats. Trans fats are manmade fats. They add flavor to a lot of packaged foods. Staying away from saturated and trans fats will help your heart.  When you do eat foods with fat, make sure they have the good fats. Monounsaturated and polyunsaturated fats are good fats. These fats help raise your good cholesterol and protect your heart.  General   How to Lower Fat and Cholesterol in Your Diet   Read the labels of the foods you buy from the market to find out how much fat is present. Under 5% of total fat on a label means it is "low fat". Over 20% of total fat on a label means it is high fat.  Eat high fiber foods, like soluble fiber. This type of fiber helps lower cholesterol in the body. Choose oatmeal, fruits (like apples), beans, and nuts to get the most soluble fiber.  Eat foods high in omega-3 fatty acids like denice seeds, walnuts, salmon, tuna, trout, herring, flaxseed, and soybeans. These foods help keep the heart healthy.  Limit your bad fat and oil intake.  Stay away from butter, stick margarine, shortening, lard, and palm and coconut oil. Pick plant-based spreads instead.  Limit mayonnaise, salad dressings, gravies, and sauces, unless it is made from low-fat ingredients.  Limit chocolate.  Do not eat high-fat processed foods like hot dogs, banda, sausage, ham and other luncheon meats high in fat, and some frozen foods. Pick fish, chicken, turkey, and lean meats instead.  Eat more dried beans, lentils, and tofu to get your protein.  Do not eat organ meats, like liver.  Choose nonfat or low-fat milk, yogurt, and cheese.  Use light or fat-free cream cheese and sour cream.  Eat lots of fruits and vegetables.  Pick whole grain breads, " cereals, pastas, and rice.  Do not eat snacks that are high in fats like granola, cookies, pies, pastries, doughnuts, and croissants.  Stay away from deep fried foods.  Help When Cooking   Remove the fat portion of meats and the skin from poultry before cooking.  Bake, broil, grill, poach, or roast poultry, fish, and lean meats.  Drain and throw away the fat that drains out of meat as you cook it.  Try to add little or no fat to foods.  Use olive or canola oil for cooking or baking.  Steam your vegetables.  Use herbs or no-oil marinades to flavor foods.         Who should use this diet?   This diet is for people who are at high risk of getting health problems like heart disease, high blood pressure, diabetes, and others. This diet is also good for all people to follow to keep your heart healthy.  What foods are good to eat?   Foods with good fats are:  Canola, peanut, and olive oil  Safflower, soybean, and corn oil  Walnuts, almonds, cashews, and peanuts  Pumpkin and sunflower seeds  [place] and tuna  Tofu  Soymilk  Avocado  What foods should be limited or avoided?   Stay away from these types of foods that have saturated fats:  Whole fat dairy products like cheese, ice cream, whole milk, and cream  Palm and coconut oils  High-fat meats like beef, lamb, poultry with the skin, banda, and sausage  Butter and lard  Stay away from these types of foods that may have trans fat:  Cookies, cakes, candy, doughnuts, baked goods, muffins, pizza dough, and pie crusts that are packaged  Fried foods  Frozen dinners  Chips and crackers  Microwave popcorn  Stick margarine and vegetable shortenings  Helpful tips   To help stay away from saturated fat:  Pick lean cuts of meat  Take the skin off chicken and turkey or pick skinless  Pick low-fat cheese, milk, and ice cream  Use liquid oils when cooking and baking, such as olive oil and canola oil  To help stay away from trans fat:  Look at your labels. Choose foods with 0% trans fat.  Read the ingredient list. Avoid foods with partially hydrogenated oil in the ingredient list. This means there is trans fat in the product.  Where can I learn more?   American Heart Association   [email or web address]   Last Reviewed Date   2021-10-05  Consumer Information Use and Disclaimer   This information is not specific medical advice and does not replace information you receive from your health care provider. This is only a brief summary of general information. It does NOT include all information about conditions, illnesses, injuries, tests, procedures, treatments, therapies, discharge instructions or life-style choices that may apply to you. You must talk with your health care provider for complete information about your health and treatment options. This information should not be used to decide whether or not to accept your health care providers advice, instructions or recommendations. Only your health care provider has the knowledge and training to provide advice that is right for you.   Copyright   Copyright © 2021 UpToDate, Inc. and its affiliates and/or licensors. All rights reserved.

## 2022-08-15 NOTE — PROGRESS NOTES
Subjective:       Patient ID: Porter Rice is a 57 y.o. male.    Chief Complaint: Annual Exam    Patient here for [employer] health physical.  Overall doing fairly well.  He had a short vacation weekend with his wife and did eat a bit more than usual.  We discussed this because his cholesterol jumped up significantly but is A1c is also up a little as well.  He admits he is not following a strict diet but thinks he can improve that and we would like to watch his sugar salt and fat intake and reassess his labs in about 3 months.    He denies chest pains or shortness of breath.  No cough or wheeze.  Other labs including thyroid, PSA, chemistry and blood count are stable.  EKG shows stable sinus bradycardia and otherwise no acute changes.  Stable compared to last year.    Chest CT shows no worrisome lung findings or changes.  Positive cholelithiasis.  Discussed gallstones with the patient.  Recommendation for repeat at 1 year was also discussed an ordered.  The he will continue to have colonoscopy for increased risk of colon cancer.  Repeat in 3 years discussed.  He did see a .    Review of Systems   Constitutional: Negative for chills, fatigue and fever.   HENT: Negative for nosebleeds and trouble swallowing.    Eyes: Negative for pain and visual disturbance.   Respiratory: Negative for cough, shortness of breath and wheezing.    Cardiovascular: Negative for chest pain and palpitations.   Gastrointestinal: Negative for abdominal pain, constipation, diarrhea, nausea and vomiting.   Genitourinary: Negative for difficulty urinating and hematuria.   Musculoskeletal: Negative for arthralgias, back pain and neck pain.   Integumentary:  Negative for rash.   Neurological: Negative for dizziness and headaches.   Hematological: Does not bruise/bleed easily.   Psychiatric/Behavioral: Negative for dysphoric mood and sleep disturbance.           Past Medical History:   Diagnosis Date    Colon polyps      Past Surgical  History:   Procedure Laterality Date    COLONOSCOPY N/A 5/24/2017    Procedure: COLONOSCOPY;  Surgeon: Aadn Scott MD;  [facility]  Service: Endoscopy;  Laterality: N/A;  please schedule as VERY FIRST CASE OF THE DAY with me    COLONOSCOPY N/A 11/8/2021    Procedure: COLONOSCOPY;  Surgeon: Adan Scott MD;  Location: [facility] ([address] [place] [address]);  Service: Endoscopy;  Laterality: N/A;  last colonoscopy on 5/24/17-repeat in 3 yrs per Dr Scott. order placed      COVID test at [facility] on 11/5-GT      Patient Active Problem List   Diagnosis    Encounter for screening colonoscopy    Villous adenoma of colon    Family history of colon cancer in father    Vitamin D deficiency    Lung nodule < 6cm on CT    Elevated glucose level    Hyperlipidemia        Objective:      Physical Exam  Constitutional:       General: He is not in acute distress.     Appearance: He is well-developed. He is obese.   HENT:      Head: Normocephalic and atraumatic.      Right Ear: Tympanic membrane, ear canal and external ear normal.      Left Ear: Tympanic membrane, ear canal and external ear normal.      Mouth/Throat:      Pharynx: No oropharyngeal exudate or posterior oropharyngeal erythema.   Eyes:      General: No scleral icterus.     Conjunctiva/sclera: Conjunctivae normal.      Pupils: Pupils are equal, round, and reactive to light.   Neck:      Thyroid: No thyromegaly.      Comments: No supraclavicular nodes palpated  Cardiovascular:      Rate and Rhythm: Normal rate and regular rhythm.      Pulses: Normal pulses.      Heart sounds: Normal heart sounds. No murmur heard.  Pulmonary:      Effort: Pulmonary effort is normal.      Breath sounds: Normal breath sounds. No wheezing.   Abdominal:      General: Bowel sounds are normal.      Palpations: Abdomen is soft. There is no mass.      Tenderness: There is no abdominal tenderness.   Genitourinary:     Comments:  deferred  Musculoskeletal:          General: No tenderness.      Cervical back: Normal range of motion and neck supple.      Right lower leg: No edema.      Left lower leg: No edema.   Lymphadenopathy:      Cervical: No cervical adenopathy.   Skin:     Coloration: Skin is not jaundiced or pale.   Neurological:      General: No focal deficit present.      Mental Status: He is alert and oriented to person, place, and time.   Psychiatric:         Mood and Affect: Mood normal.         Behavior: Behavior normal.         Assessment:       Problem List Items Addressed This Visit        Pulmonary    Lung nodule < 6cm on CT    Relevant Orders    CT Chest Lung Screening Low Dose    Basic Metabolic Panel    Cholesterol, Total    Hemoglobin A1C       Cardiac/Vascular    Hyperlipidemia       Endocrine    Elevated glucose level    Relevant Orders    Basic Metabolic Panel    Cholesterol, Total    Hemoglobin A1C      Other Visit Diagnoses     Routine physical examination    -  Primary    Relevant Orders    CT Chest Lung Screening Low Dose    Nicotine dependence, cigarettes, uncomplicated        Relevant Orders    CT Chest Lung Screening Low Dose    Basic Metabolic Panel    Cholesterol, Total    Hemoglobin A1C    Calculus of gallbladder without cholecystitis without obstruction              Plan:         Porter was seen today for annual exam.    Diagnoses and all orders for this visit:    Routine physical examination  -     CT Chest Lung Screening Low Dose; Future    Lung nodule < 6cm on CT  -     CT Chest Lung Screening Low Dose; Future  -     Basic Metabolic Panel; Future  -     Cholesterol, Total; Future  -     Hemoglobin A1C; Future    Nicotine dependence, cigarettes, uncomplicated  -     CT Chest Lung Screening Low Dose; Future  -     Basic Metabolic Panel; Future  -     Cholesterol, Total; Future  -     Hemoglobin A1C; Future    Elevated glucose level  -     Basic Metabolic Panel; Future  -     Cholesterol, Total; Future  -     Hemoglobin A1C;  "Future    Hyperlipidemia, unspecified hyperlipidemia type    Calculus of gallbladder without cholecystitis without obstruction    Other orders  -     triamcinolone acetonide 0.1% (KENALOG) 0.1 % cream; Apply topically 2 (two) times daily.               Continue to work on weight diet and exercise.  Patient information sheets for low-fat and low sugar diets discussed.  Reassess labs in 3 months with a visit.      Portions of this note may have been created with voice recognition software. Occasional "wrong-word" or "sound-a-like" substitutions may have occurred due to the inherent limitations of voice recognition software. Please, read the note carefully and recognize, using context, where substitutions have occurred.  "

## 2022-08-17 DIAGNOSIS — E11.9 TYPE 2 DIABETES MELLITUS WITHOUT COMPLICATION: ICD-10-CM

## 2022-08-24 ENCOUNTER — PATIENT OUTREACH (OUTPATIENT)
Dept: ADMINISTRATIVE | Facility: HOSPITAL | Age: 57
End: 2022-08-24
Payer: COMMERCIAL

## 2022-08-24 ENCOUNTER — PATIENT MESSAGE (OUTPATIENT)
Dept: ADMINISTRATIVE | Facility: HOSPITAL | Age: 57
End: 2022-08-24
Payer: COMMERCIAL

## 2022-10-26 DIAGNOSIS — E11.9 TYPE 2 DIABETES MELLITUS WITHOUT COMPLICATION, UNSPECIFIED WHETHER LONG TERM INSULIN USE: ICD-10-CM

## 2022-10-31 ENCOUNTER — PATIENT MESSAGE (OUTPATIENT)
Dept: ADMINISTRATIVE | Facility: HOSPITAL | Age: 57
End: 2022-10-31
Payer: COMMERCIAL

## 2022-11-11 ENCOUNTER — LAB VISIT (OUTPATIENT)
Dept: LAB | Facility: HOSPITAL | Age: 57
End: 2022-11-11
Attending: INTERNAL MEDICINE
Payer: COMMERCIAL

## 2022-11-11 DIAGNOSIS — F17.210 NICOTINE DEPENDENCE, CIGARETTES, UNCOMPLICATED: ICD-10-CM

## 2022-11-11 DIAGNOSIS — R73.09 ELEVATED GLUCOSE LEVEL: ICD-10-CM

## 2022-11-11 LAB
ANION GAP SERPL CALC-SCNC: 8 MMOL/L (ref 8–16)
BUN SERPL-MCNC: 15 MG/DL (ref 6–20)
CALCIUM SERPL-MCNC: 9.4 MG/DL (ref 8.7–10.5)
CHLORIDE SERPL-SCNC: 108 MMOL/L (ref 95–110)
CHOLEST SERPL-MCNC: 188 MG/DL (ref 120–199)
CO2 SERPL-SCNC: 25 MMOL/L (ref 23–29)
CREAT SERPL-MCNC: 0.8 MG/DL (ref 0.5–1.4)
EST. GFR  (NO RACE VARIABLE): >60 ML/MIN/1.73 M^2
ESTIMATED AVG GLUCOSE: 114 MG/DL (ref 68–131)
GLUCOSE SERPL-MCNC: 95 MG/DL (ref 70–110)
HBA1C MFR BLD: 5.6 % (ref 4–5.6)
POTASSIUM SERPL-SCNC: 4 MMOL/L (ref 3.5–5.1)
SODIUM SERPL-SCNC: 141 MMOL/L (ref 136–145)

## 2022-11-11 PROCEDURE — 80048 BASIC METABOLIC PNL TOTAL CA: CPT | Performed by: INTERNAL MEDICINE

## 2022-11-11 PROCEDURE — 36415 COLL VENOUS BLD VENIPUNCTURE: CPT | Performed by: INTERNAL MEDICINE

## 2022-11-11 PROCEDURE — 83036 HEMOGLOBIN GLYCOSYLATED A1C: CPT | Performed by: INTERNAL MEDICINE

## 2022-11-11 PROCEDURE — 82465 ASSAY BLD/SERUM CHOLESTEROL: CPT | Performed by: INTERNAL MEDICINE

## 2022-11-15 ENCOUNTER — OFFICE VISIT (OUTPATIENT)
Dept: INTERNAL MEDICINE | Facility: CLINIC | Age: 57
End: 2022-11-15
Payer: COMMERCIAL

## 2022-11-15 VITALS
DIASTOLIC BLOOD PRESSURE: 76 MMHG | BODY MASS INDEX: 30.65 KG/M2 | SYSTOLIC BLOOD PRESSURE: 112 MMHG | HEART RATE: 72 BPM | HEIGHT: 70 IN | OXYGEN SATURATION: 97 % | WEIGHT: 214.06 LBS

## 2022-11-15 DIAGNOSIS — R73.09 ELEVATED GLUCOSE LEVEL: Primary | ICD-10-CM

## 2022-11-15 DIAGNOSIS — E78.5 HYPERLIPIDEMIA, UNSPECIFIED HYPERLIPIDEMIA TYPE: ICD-10-CM

## 2022-11-15 PROCEDURE — 1159F MED LIST DOCD IN RCRD: CPT | Mod: CPTII,S$GLB,, | Performed by: INTERNAL MEDICINE

## 2022-11-15 PROCEDURE — 1160F PR REVIEW ALL MEDS BY PRESCRIBER/CLIN PHARMACIST DOCUMENTED: ICD-10-PCS | Mod: CPTII,S$GLB,, | Performed by: INTERNAL MEDICINE

## 2022-11-15 PROCEDURE — 1160F RVW MEDS BY RX/DR IN RCRD: CPT | Mod: CPTII,S$GLB,, | Performed by: INTERNAL MEDICINE

## 2022-11-15 PROCEDURE — 3074F SYST BP LT 130 MM HG: CPT | Mod: CPTII,S$GLB,, | Performed by: INTERNAL MEDICINE

## 2022-11-15 PROCEDURE — 3078F PR MOST RECENT DIASTOLIC BLOOD PRESSURE < 80 MM HG: ICD-10-PCS | Mod: CPTII,S$GLB,, | Performed by: INTERNAL MEDICINE

## 2022-11-15 PROCEDURE — 3008F BODY MASS INDEX DOCD: CPT | Mod: CPTII,S$GLB,, | Performed by: INTERNAL MEDICINE

## 2022-11-15 PROCEDURE — 3008F PR BODY MASS INDEX (BMI) DOCUMENTED: ICD-10-PCS | Mod: CPTII,S$GLB,, | Performed by: INTERNAL MEDICINE

## 2022-11-15 PROCEDURE — 1159F PR MEDICATION LIST DOCUMENTED IN MEDICAL RECORD: ICD-10-PCS | Mod: CPTII,S$GLB,, | Performed by: INTERNAL MEDICINE

## 2022-11-15 PROCEDURE — 3074F PR MOST RECENT SYSTOLIC BLOOD PRESSURE < 130 MM HG: ICD-10-PCS | Mod: CPTII,S$GLB,, | Performed by: INTERNAL MEDICINE

## 2022-11-15 PROCEDURE — 3044F HG A1C LEVEL LT 7.0%: CPT | Mod: CPTII,S$GLB,, | Performed by: INTERNAL MEDICINE

## 2022-11-15 PROCEDURE — 99214 OFFICE O/P EST MOD 30 MIN: CPT | Mod: S$GLB,,, | Performed by: INTERNAL MEDICINE

## 2022-11-15 PROCEDURE — 99999 PR PBB SHADOW E&M-EST. PATIENT-LVL III: ICD-10-PCS | Mod: PBBFAC,,, | Performed by: INTERNAL MEDICINE

## 2022-11-15 PROCEDURE — 3044F PR MOST RECENT HEMOGLOBIN A1C LEVEL <7.0%: ICD-10-PCS | Mod: CPTII,S$GLB,, | Performed by: INTERNAL MEDICINE

## 2022-11-15 PROCEDURE — 3078F DIAST BP <80 MM HG: CPT | Mod: CPTII,S$GLB,, | Performed by: INTERNAL MEDICINE

## 2022-11-15 PROCEDURE — 99999 PR PBB SHADOW E&M-EST. PATIENT-LVL III: CPT | Mod: PBBFAC,,, | Performed by: INTERNAL MEDICINE

## 2022-11-15 PROCEDURE — 99214 PR OFFICE/OUTPT VISIT, EST, LEVL IV, 30-39 MIN: ICD-10-PCS | Mod: S$GLB,,, | Performed by: INTERNAL MEDICINE

## 2022-11-15 NOTE — PROGRESS NOTES
Subjective:       Patient ID: Porter Rice is a 57 y.o. male.    Chief Complaint: Follow-up    Patient comes in for follow-up hyperlipidemia and elevated A1c above the level for diabetes.  Last visit A1c went up to 6.8% and sugar went up very high.  Worked extremely hard, lost 14 lb in 3 months, his weight, sugar, A1c, cholesterol and blood pressure all improved to well within normal range.  I explained that I was extremely happy with his progress and he credits his wife for assisting him.    He denies any problems or concerns.  He is seeing Dermatology next week    Review of Systems   Constitutional:  Negative for unexpected weight change.   Respiratory:  Negative for cough.    Cardiovascular:  Negative for chest pain.   Gastrointestinal:  Negative for abdominal pain.   Genitourinary:  Negative for frequency.   Integumentary:  Positive for rash (Both thumbs, right worse than left).         Past Medical History:   Diagnosis Date    Colon polyps      Past Surgical History:   Procedure Laterality Date    COLONOSCOPY N/A 5/24/2017    Procedure: COLONOSCOPY;  Surgeon: Adan Scott MD;  Location: [facility] ([address] [place] [address]);  Service: Endoscopy;  Laterality: N/A;  please schedule as VERY FIRST CASE OF THE DAY with me    COLONOSCOPY N/A 11/8/2021    Procedure: COLONOSCOPY;  Surgeon: Adan Scott MD;  Location: [facility] ([address] [place] [address]);  Service: Endoscopy;  Laterality: N/A;  last colonoscopy on 5/24/17-repeat in 3 yrs per Dr Scott. order placed      COVID test at [facility] on 11/5-GT      Patient Active Problem List   Diagnosis    Encounter for screening colonoscopy    Villous adenoma of colon    Family history of colon cancer in father    Vitamin D deficiency    Lung nodule < 6cm on CT    Elevated glucose level    Hyperlipidemia        Objective:      Physical Exam  Constitutional:       Appearance: Normal appearance. He is obese.   Cardiovascular:      Rate and Rhythm: Normal rate and regular rhythm.  "  Pulmonary:      Effort: Pulmonary effort is normal.      Breath sounds: No wheezing.   Abdominal:      General: There is no distension.      Tenderness: There is no abdominal tenderness.   Skin:     Findings: Rash (both thumbs right worse than left) present.   Neurological:      Mental Status: He is alert.       Assessment:       Problem List Items Addressed This Visit          Cardiac/Vascular    Hyperlipidemia    Relevant Orders    Hemoglobin A1C    Cholesterol, Total    Basic Metabolic Panel       Endocrine    Elevated glucose level - Primary    Relevant Orders    Hemoglobin A1C    Cholesterol, Total    Basic Metabolic Panel       Plan:         Edward was seen today for follow-up.    Diagnoses and all orders for this visit:    Elevated glucose level  -     Hemoglobin A1C; Future  -     Cholesterol, Total; Future  -     Basic Metabolic Panel; Future    Hyperlipidemia, unspecified hyperlipidemia type  -     Hemoglobin A1C; Future  -     Cholesterol, Total; Future  -     Basic Metabolic Panel; Future         Excellent improvement   Follow-up in 3-4 months with labs to hopefully keep momentum going        Portions of this note may have been created with voice recognition software. Occasional "wrong-word" or "sound-a-like" substitutions may have occurred due to the inherent limitations of voice recognition software. Please, read the note carefully and recognize, using context, where substitutions have occurred.    "

## 2023-03-14 ENCOUNTER — LAB VISIT (OUTPATIENT)
Dept: LAB | Facility: HOSPITAL | Age: 58
End: 2023-03-14
Attending: INTERNAL MEDICINE
Payer: COMMERCIAL

## 2023-03-14 DIAGNOSIS — E78.5 HYPERLIPIDEMIA, UNSPECIFIED HYPERLIPIDEMIA TYPE: ICD-10-CM

## 2023-03-14 DIAGNOSIS — R73.09 ELEVATED GLUCOSE LEVEL: ICD-10-CM

## 2023-03-14 LAB
ANION GAP SERPL CALC-SCNC: 6 MMOL/L (ref 8–16)
BUN SERPL-MCNC: 17 MG/DL (ref 6–20)
CALCIUM SERPL-MCNC: 9.2 MG/DL (ref 8.7–10.5)
CHLORIDE SERPL-SCNC: 108 MMOL/L (ref 95–110)
CHOLEST SERPL-MCNC: 215 MG/DL (ref 120–199)
CO2 SERPL-SCNC: 27 MMOL/L (ref 23–29)
CREAT SERPL-MCNC: 0.9 MG/DL (ref 0.5–1.4)
EST. GFR  (NO RACE VARIABLE): >60 ML/MIN/1.73 M^2
ESTIMATED AVG GLUCOSE: 120 MG/DL (ref 68–131)
GLUCOSE SERPL-MCNC: 110 MG/DL (ref 70–110)
HBA1C MFR BLD: 5.8 % (ref 4–5.6)
POTASSIUM SERPL-SCNC: 4.4 MMOL/L (ref 3.5–5.1)
SODIUM SERPL-SCNC: 141 MMOL/L (ref 136–145)

## 2023-03-14 PROCEDURE — 80048 BASIC METABOLIC PNL TOTAL CA: CPT | Performed by: INTERNAL MEDICINE

## 2023-03-14 PROCEDURE — 82465 ASSAY BLD/SERUM CHOLESTEROL: CPT | Performed by: INTERNAL MEDICINE

## 2023-03-14 PROCEDURE — 36415 COLL VENOUS BLD VENIPUNCTURE: CPT | Performed by: INTERNAL MEDICINE

## 2023-03-14 PROCEDURE — 83036 HEMOGLOBIN GLYCOSYLATED A1C: CPT | Performed by: INTERNAL MEDICINE

## 2023-03-21 ENCOUNTER — OFFICE VISIT (OUTPATIENT)
Dept: INTERNAL MEDICINE | Facility: CLINIC | Age: 58
End: 2023-03-21
Payer: COMMERCIAL

## 2023-03-21 VITALS
SYSTOLIC BLOOD PRESSURE: 120 MMHG | OXYGEN SATURATION: 97 % | BODY MASS INDEX: 30.52 KG/M2 | HEIGHT: 70 IN | HEART RATE: 72 BPM | WEIGHT: 213.19 LBS | DIASTOLIC BLOOD PRESSURE: 82 MMHG

## 2023-03-21 DIAGNOSIS — L30.9 DERMATITIS: ICD-10-CM

## 2023-03-21 DIAGNOSIS — R73.09 ELEVATED GLUCOSE LEVEL: ICD-10-CM

## 2023-03-21 DIAGNOSIS — E78.5 HYPERLIPIDEMIA, UNSPECIFIED HYPERLIPIDEMIA TYPE: Primary | ICD-10-CM

## 2023-03-21 DIAGNOSIS — Z12.5 SCREENING FOR PROSTATE CANCER: ICD-10-CM

## 2023-03-21 PROCEDURE — 3044F HG A1C LEVEL LT 7.0%: CPT | Mod: CPTII,S$GLB,, | Performed by: INTERNAL MEDICINE

## 2023-03-21 PROCEDURE — 99999 PR PBB SHADOW E&M-EST. PATIENT-LVL III: ICD-10-PCS | Mod: PBBFAC,,, | Performed by: INTERNAL MEDICINE

## 2023-03-21 PROCEDURE — 99214 PR OFFICE/OUTPT VISIT, EST, LEVL IV, 30-39 MIN: ICD-10-PCS | Mod: S$GLB,,, | Performed by: INTERNAL MEDICINE

## 2023-03-21 PROCEDURE — 3079F DIAST BP 80-89 MM HG: CPT | Mod: CPTII,S$GLB,, | Performed by: INTERNAL MEDICINE

## 2023-03-21 PROCEDURE — 1160F PR REVIEW ALL MEDS BY PRESCRIBER/CLIN PHARMACIST DOCUMENTED: ICD-10-PCS | Mod: CPTII,S$GLB,, | Performed by: INTERNAL MEDICINE

## 2023-03-21 PROCEDURE — 99999 PR PBB SHADOW E&M-EST. PATIENT-LVL III: CPT | Mod: PBBFAC,,, | Performed by: INTERNAL MEDICINE

## 2023-03-21 PROCEDURE — 3074F PR MOST RECENT SYSTOLIC BLOOD PRESSURE < 130 MM HG: ICD-10-PCS | Mod: CPTII,S$GLB,, | Performed by: INTERNAL MEDICINE

## 2023-03-21 PROCEDURE — 3008F PR BODY MASS INDEX (BMI) DOCUMENTED: ICD-10-PCS | Mod: CPTII,S$GLB,, | Performed by: INTERNAL MEDICINE

## 2023-03-21 PROCEDURE — 3074F SYST BP LT 130 MM HG: CPT | Mod: CPTII,S$GLB,, | Performed by: INTERNAL MEDICINE

## 2023-03-21 PROCEDURE — 99214 OFFICE O/P EST MOD 30 MIN: CPT | Mod: S$GLB,,, | Performed by: INTERNAL MEDICINE

## 2023-03-21 PROCEDURE — 3008F BODY MASS INDEX DOCD: CPT | Mod: CPTII,S$GLB,, | Performed by: INTERNAL MEDICINE

## 2023-03-21 PROCEDURE — 3079F PR MOST RECENT DIASTOLIC BLOOD PRESSURE 80-89 MM HG: ICD-10-PCS | Mod: CPTII,S$GLB,, | Performed by: INTERNAL MEDICINE

## 2023-03-21 PROCEDURE — 1159F PR MEDICATION LIST DOCUMENTED IN MEDICAL RECORD: ICD-10-PCS | Mod: CPTII,S$GLB,, | Performed by: INTERNAL MEDICINE

## 2023-03-21 PROCEDURE — 1159F MED LIST DOCD IN RCRD: CPT | Mod: CPTII,S$GLB,, | Performed by: INTERNAL MEDICINE

## 2023-03-21 PROCEDURE — 1160F RVW MEDS BY RX/DR IN RCRD: CPT | Mod: CPTII,S$GLB,, | Performed by: INTERNAL MEDICINE

## 2023-03-21 PROCEDURE — 3044F PR MOST RECENT HEMOGLOBIN A1C LEVEL <7.0%: ICD-10-PCS | Mod: CPTII,S$GLB,, | Performed by: INTERNAL MEDICINE

## 2023-03-21 NOTE — PROGRESS NOTES
Subjective:       Patient ID: Porter Rice is a 58 y.o. male.    Chief Complaint: Follow-up    HPI:  58-year-old comes in to follow-up mild diabetes and hyperlipidemia.  He has lost 13 or 14 lb in the last 6 months and his A1c improved and cholesterol improved.  Blood pressure is stable.  We reviewed all of this in detail and he is satisfied with his progress thus far but hopes to lose at least another 13 lb to get below 200.  He is seeing an outside dermatologist or some dry irritated skin on the hand.      Review of Systems   Constitutional:  Negative for fever and unexpected weight change.   Respiratory:  Negative for cough and shortness of breath.    Cardiovascular:  Negative for chest pain.   Gastrointestinal:  Negative for abdominal pain.   Integumentary:  Positive for rash.         Past Medical History:   Diagnosis Date    Colon polyps      Past Surgical History:   Procedure Laterality Date    COLONOSCOPY N/A 5/24/2017    Procedure: COLONOSCOPY;  Surgeon: Adan Scott MD;  Location: [address]);  Service: Endoscopy;  Laterality: N/A;  please schedule as VERY FIRST CASE OF THE DAY with me    COLONOSCOPY N/A 11/8/2021    Procedure: COLONOSCOPY;  Surgeon: Adan Scott MD;  Location: [address]);  Service: Endoscopy;  Laterality: N/A;  last colonoscopy on 5/24/17-repeat in 3 yrs per Dr Scott. order placed      COVID test at [facility] on 11/5-GT      Patient Active Problem List   Diagnosis    Encounter for screening colonoscopy    Villous adenoma of colon    Family history of colon cancer in father    Vitamin D deficiency    Lung nodule < 6cm on CT    Elevated glucose level    Hyperlipidemia        Objective:      Physical Exam  Constitutional:       Appearance: Normal appearance.   HENT:      Head: Normocephalic and atraumatic.   Cardiovascular:      Rate and Rhythm: Normal rate and regular rhythm.      Pulses: Normal pulses.   Pulmonary:      Effort: Pulmonary effort is  "normal. No respiratory distress.      Breath sounds: No wheezing.   Abdominal:      Tenderness: There is no abdominal tenderness.   Skin:     Findings: Rash (Right hand peeling and cracking skin at the right thumb) present.   Neurological:      Mental Status: He is alert.   Psychiatric:         Mood and Affect: Mood normal.         Thought Content: Thought content normal.       Assessment:       Problem List Items Addressed This Visit          Cardiac/Vascular    Hyperlipidemia - Primary    Relevant Orders    TSH    Lipid Panel    Hemoglobin A1C    Comprehensive Metabolic Panel    CBC Auto Differential       Endocrine    Elevated glucose level    Relevant Orders    TSH    Lipid Panel    Hemoglobin A1C    Comprehensive Metabolic Panel    CBC Auto Differential     Other Visit Diagnoses       Screening for prostate cancer        Relevant Orders    PSA, Screening    Dermatitis                  Plan:         Porter was seen today for follow-up.    Diagnoses and all orders for this visit:    Hyperlipidemia, unspecified hyperlipidemia type  -     TSH; Future  -     Lipid Panel; Future  -     Hemoglobin A1C; Future  -     Comprehensive Metabolic Panel; Future  -     CBC Auto Differential; Future    Elevated glucose level  -     TSH; Future  -     Lipid Panel; Future  -     Hemoglobin A1C; Future  -     Comprehensive Metabolic Panel; Future  -     CBC Auto Differential; Future    Screening for prostate cancer  -     PSA, Screening; Future    Dermatitis                     Portions of this note may have been created with voice recognition software. Occasional "wrong-word" or "sound-a-like" substitutions may have occurred due to the inherent limitations of voice recognition software. Please, read the note carefully and recognize, using context, where substitutions have occurred.  "

## 2023-04-19 DIAGNOSIS — Z00.00 ROUTINE GENERAL MEDICAL EXAMINATION AT A HEALTH CARE FACILITY: Primary | ICD-10-CM

## 2023-06-07 ENCOUNTER — PATIENT MESSAGE (OUTPATIENT)
Dept: INFECTIOUS DISEASES | Facility: CLINIC | Age: 58
End: 2023-06-07
Payer: COMMERCIAL

## 2023-06-12 ENCOUNTER — CLINICAL SUPPORT (OUTPATIENT)
Dept: INTERNAL MEDICINE | Facility: CLINIC | Age: 58
End: 2023-06-12
Payer: COMMERCIAL

## 2023-06-12 ENCOUNTER — PATIENT MESSAGE (OUTPATIENT)
Dept: INTERNAL MEDICINE | Facility: CLINIC | Age: 58
End: 2023-06-12

## 2023-06-12 ENCOUNTER — RESEARCH ENCOUNTER (OUTPATIENT)
Dept: RESEARCH | Facility: HOSPITAL | Age: 58
End: 2023-06-12
Payer: COMMERCIAL

## 2023-06-12 ENCOUNTER — OFFICE VISIT (OUTPATIENT)
Dept: INTERNAL MEDICINE | Facility: CLINIC | Age: 58
End: 2023-06-12
Payer: COMMERCIAL

## 2023-06-12 ENCOUNTER — HOSPITAL ENCOUNTER (OUTPATIENT)
Dept: CARDIOLOGY | Facility: CLINIC | Age: 58
Discharge: HOME | End: 2023-06-12
Payer: COMMERCIAL

## 2023-06-12 ENCOUNTER — HOSPITAL ENCOUNTER (OUTPATIENT)
Dept: RADIOLOGY | Facility: HOSPITAL | Age: 58
Discharge: HOME | End: 2023-06-12
Attending: INTERNAL MEDICINE
Payer: COMMERCIAL

## 2023-06-12 VITALS
HEART RATE: 61 BPM | DIASTOLIC BLOOD PRESSURE: 80 MMHG | BODY MASS INDEX: 30.87 KG/M2 | OXYGEN SATURATION: 98 % | HEIGHT: 70 IN | WEIGHT: 215.63 LBS | SYSTOLIC BLOOD PRESSURE: 110 MMHG

## 2023-06-12 DIAGNOSIS — R73.09 ELEVATED GLUCOSE LEVEL: ICD-10-CM

## 2023-06-12 DIAGNOSIS — Z00.00 ROUTINE GENERAL MEDICAL EXAMINATION AT A HEALTH CARE FACILITY: ICD-10-CM

## 2023-06-12 DIAGNOSIS — E78.5 HYPERLIPIDEMIA, UNSPECIFIED HYPERLIPIDEMIA TYPE: ICD-10-CM

## 2023-06-12 DIAGNOSIS — E55.9 VITAMIN D DEFICIENCY: ICD-10-CM

## 2023-06-12 DIAGNOSIS — Z72.0 TOBACCO ABUSE DISORDER: ICD-10-CM

## 2023-06-12 DIAGNOSIS — Z00.00 ROUTINE PHYSICAL EXAMINATION: Primary | ICD-10-CM

## 2023-06-12 DIAGNOSIS — Z00.00 ROUTINE GENERAL MEDICAL EXAMINATION AT A HEALTH CARE FACILITY: Primary | ICD-10-CM

## 2023-06-12 LAB
25(OH)D3+25(OH)D2 SERPL-MCNC: 21 NG/ML (ref 30–96)
ALBUMIN SERPL BCP-MCNC: 3.8 G/DL (ref 3.5–5.2)
ALP SERPL-CCNC: 62 U/L (ref 55–135)
ALT SERPL W/O P-5'-P-CCNC: 21 U/L (ref 10–44)
ANION GAP SERPL CALC-SCNC: 7 MMOL/L (ref 8–16)
AST SERPL-CCNC: 21 U/L (ref 10–40)
BILIRUB SERPL-MCNC: 0.6 MG/DL (ref 0.1–1)
BUN SERPL-MCNC: 15 MG/DL (ref 6–20)
CALCIUM SERPL-MCNC: 9.3 MG/DL (ref 8.7–10.5)
CHLORIDE SERPL-SCNC: 109 MMOL/L (ref 95–110)
CHOLEST SERPL-MCNC: 224 MG/DL (ref 120–199)
CHOLEST/HDLC SERPL: 4.7 {RATIO} (ref 2–5)
CO2 SERPL-SCNC: 25 MMOL/L (ref 23–29)
COMPLEXED PSA SERPL-MCNC: 0.32 NG/ML (ref 0–4)
CREAT SERPL-MCNC: 0.9 MG/DL (ref 0.5–1.4)
ERYTHROCYTE [DISTWIDTH] IN BLOOD BY AUTOMATED COUNT: 12 % (ref 11.5–14.5)
EST. GFR  (NO RACE VARIABLE): >60 ML/MIN/1.73 M^2
ESTIMATED AVG GLUCOSE: 120 MG/DL (ref 68–131)
GLUCOSE SERPL-MCNC: 126 MG/DL (ref 70–110)
HBA1C MFR BLD: 5.8 % (ref 4–5.6)
HCT VFR BLD AUTO: 44.5 % (ref 40–54)
HDLC SERPL-MCNC: 48 MG/DL (ref 40–75)
HDLC SERPL: 21.4 % (ref 20–50)
HGB BLD-MCNC: 15.2 G/DL (ref 14–18)
LDLC SERPL CALC-MCNC: 153.6 MG/DL (ref 63–159)
MCH RBC QN AUTO: 30.3 PG (ref 27–31)
MCHC RBC AUTO-ENTMCNC: 34.2 G/DL (ref 32–36)
MCV RBC AUTO: 89 FL (ref 82–98)
NONHDLC SERPL-MCNC: 176 MG/DL
PLATELET # BLD AUTO: 171 K/UL (ref 150–450)
PMV BLD AUTO: 11 FL (ref 9.2–12.9)
POTASSIUM SERPL-SCNC: 4.1 MMOL/L (ref 3.5–5.1)
PROT SERPL-MCNC: 6.7 G/DL (ref 6–8.4)
RBC # BLD AUTO: 5.01 M/UL (ref 4.6–6.2)
SODIUM SERPL-SCNC: 141 MMOL/L (ref 136–145)
TRIGL SERPL-MCNC: 112 MG/DL (ref 30–150)
TSH SERPL DL<=0.005 MIU/L-ACNC: 1.4 UIU/ML (ref 0.4–4)
WBC # BLD AUTO: 5.97 K/UL (ref 3.9–12.7)

## 2023-06-12 PROCEDURE — 1159F PR MEDICATION LIST DOCUMENTED IN MEDICAL RECORD: ICD-10-PCS | Mod: CPTII,S$GLB,, | Performed by: INTERNAL MEDICINE

## 2023-06-12 PROCEDURE — 3008F PR BODY MASS INDEX (BMI) DOCUMENTED: ICD-10-PCS | Mod: CPTII,S$GLB,, | Performed by: INTERNAL MEDICINE

## 2023-06-12 PROCEDURE — 71271 CT CHEST LUNG SCREENING LOW DOSE: ICD-10-PCS | Mod: 26,,, | Performed by: STUDENT IN AN ORGANIZED HEALTH CARE EDUCATION/TRAINING PROGRAM

## 2023-06-12 PROCEDURE — 82306 VITAMIN D 25 HYDROXY: CPT | Performed by: INTERNAL MEDICINE

## 2023-06-12 PROCEDURE — 36415 COLL VENOUS BLD VENIPUNCTURE: CPT | Performed by: INTERNAL MEDICINE

## 2023-06-12 PROCEDURE — 1160F RVW MEDS BY RX/DR IN RCRD: CPT | Mod: CPTII,S$GLB,, | Performed by: INTERNAL MEDICINE

## 2023-06-12 PROCEDURE — 80061 LIPID PANEL: CPT | Performed by: INTERNAL MEDICINE

## 2023-06-12 PROCEDURE — 80053 COMPREHEN METABOLIC PANEL: CPT | Performed by: INTERNAL MEDICINE

## 2023-06-12 PROCEDURE — 1159F MED LIST DOCD IN RCRD: CPT | Mod: CPTII,S$GLB,, | Performed by: INTERNAL MEDICINE

## 2023-06-12 PROCEDURE — 1160F PR REVIEW ALL MEDS BY PRESCRIBER/CLIN PHARMACIST DOCUMENTED: ICD-10-PCS | Mod: CPTII,S$GLB,, | Performed by: INTERNAL MEDICINE

## 2023-06-12 PROCEDURE — 84153 ASSAY OF PSA TOTAL: CPT | Performed by: INTERNAL MEDICINE

## 2023-06-12 PROCEDURE — 3079F DIAST BP 80-89 MM HG: CPT | Mod: CPTII,S$GLB,, | Performed by: INTERNAL MEDICINE

## 2023-06-12 PROCEDURE — 3074F SYST BP LT 130 MM HG: CPT | Mod: CPTII,S$GLB,, | Performed by: INTERNAL MEDICINE

## 2023-06-12 PROCEDURE — 84443 ASSAY THYROID STIM HORMONE: CPT | Performed by: INTERNAL MEDICINE

## 2023-06-12 PROCEDURE — 99999 PR PBB SHADOW E&M-EST. PATIENT-LVL I: ICD-10-PCS | Mod: PBBFAC,,,

## 2023-06-12 PROCEDURE — 99999 PR PBB SHADOW E&M-EST. PATIENT-LVL III: ICD-10-PCS | Mod: PBBFAC,,, | Performed by: INTERNAL MEDICINE

## 2023-06-12 PROCEDURE — 3079F PR MOST RECENT DIASTOLIC BLOOD PRESSURE 80-89 MM HG: ICD-10-PCS | Mod: CPTII,S$GLB,, | Performed by: INTERNAL MEDICINE

## 2023-06-12 PROCEDURE — 99999 PR PBB SHADOW E&M-EST. PATIENT-LVL I: CPT | Mod: PBBFAC,,,

## 2023-06-12 PROCEDURE — 99999 PR PBB SHADOW E&M-EST. PATIENT-LVL III: CPT | Mod: PBBFAC,,, | Performed by: INTERNAL MEDICINE

## 2023-06-12 PROCEDURE — 3074F PR MOST RECENT SYSTOLIC BLOOD PRESSURE < 130 MM HG: ICD-10-PCS | Mod: CPTII,S$GLB,, | Performed by: INTERNAL MEDICINE

## 2023-06-12 PROCEDURE — 93010 ELECTROCARDIOGRAM REPORT: CPT | Mod: S$GLB,,, | Performed by: INTERNAL MEDICINE

## 2023-06-12 PROCEDURE — 99396 PREV VISIT EST AGE 40-64: CPT | Mod: S$GLB,,, | Performed by: INTERNAL MEDICINE

## 2023-06-12 PROCEDURE — 3044F PR MOST RECENT HEMOGLOBIN A1C LEVEL <7.0%: ICD-10-PCS | Mod: CPTII,S$GLB,, | Performed by: INTERNAL MEDICINE

## 2023-06-12 PROCEDURE — 93005 ELECTROCARDIOGRAM TRACING: CPT | Mod: S$GLB,,, | Performed by: INTERNAL MEDICINE

## 2023-06-12 PROCEDURE — 71271 CT THORAX LUNG CANCER SCR C-: CPT | Mod: 26,,, | Performed by: STUDENT IN AN ORGANIZED HEALTH CARE EDUCATION/TRAINING PROGRAM

## 2023-06-12 PROCEDURE — 93005 EKG 12-LEAD: ICD-10-PCS | Mod: S$GLB,,, | Performed by: INTERNAL MEDICINE

## 2023-06-12 PROCEDURE — 99396 PR PREVENTIVE VISIT,EST,40-64: ICD-10-PCS | Mod: S$GLB,,, | Performed by: INTERNAL MEDICINE

## 2023-06-12 PROCEDURE — 71271 CT THORAX LUNG CANCER SCR C-: CPT | Mod: TC

## 2023-06-12 PROCEDURE — 3044F HG A1C LEVEL LT 7.0%: CPT | Mod: CPTII,S$GLB,, | Performed by: INTERNAL MEDICINE

## 2023-06-12 PROCEDURE — 3008F BODY MASS INDEX DOCD: CPT | Mod: CPTII,S$GLB,, | Performed by: INTERNAL MEDICINE

## 2023-06-12 PROCEDURE — 85027 COMPLETE CBC AUTOMATED: CPT | Performed by: INTERNAL MEDICINE

## 2023-06-12 PROCEDURE — 83036 HEMOGLOBIN GLYCOSYLATED A1C: CPT | Performed by: INTERNAL MEDICINE

## 2023-06-12 PROCEDURE — 93010 EKG 12-LEAD: ICD-10-PCS | Mod: S$GLB,,, | Performed by: INTERNAL MEDICINE

## 2023-06-12 RX ORDER — TRIAMCINOLONE ACETONIDE 1 MG/G
CREAM TOPICAL 2 TIMES DAILY
Qty: 45 G | Refills: 1 | Status: SHIPPED | OUTPATIENT
Start: 2023-06-12

## 2023-06-12 NOTE — LETTER
June 12, 2023    Porter Rice  [address] [place] [address]             [facility]  [address]  [facility] [address]  Phone: [phone]  Fax: [phone] Dear Mr. Rice:        Thank you for allowing me to serve you and perform your Executive Health exam on 6/12/2023.  This letter will serve a brief summary of the history, physical findings, and laboratory/studies performed and recommendations at that time.    Reason for Visit: Executive Health Preventive Physical Examination    Subjective:       Patient ID: Porter Rice is a 58 y.o. male.    Chief Complaint: Annual Exam    HPI:  Patient here for executive health physical.  Overall he is doing well.  He is working on weight reduction.  His lipids and sugar averages have improved since last year.  He is caring for elderly family members both himself and his wife.  That has been somewhat physically and emotionally stressful but it looks like things are improving / stabilizing.    His preliminary EKG result look stable.  Waiting on his chest CT scan   Labs reviewed in detail including PSA, A1c, lipids, chemistry and blood count.  Still with mild elevated lipids the patient wishes to keep working on weight and continue to monitor.  Blood pressure is stable.  Likely colon screening next year    Review of Systems   Constitutional:  Negative for chills, fatigue and fever.   HENT:  Negative for nosebleeds and trouble swallowing.    Eyes:  Negative for pain and visual disturbance.   Respiratory:  Negative for cough, shortness of breath and wheezing.    Cardiovascular:  Negative for chest pain and palpitations.   Gastrointestinal:  Negative for abdominal pain, constipation, diarrhea, nausea and vomiting.   Genitourinary:  Negative for difficulty urinating and hematuria.   Musculoskeletal:  Negative for arthralgias, back pain and neck pain.   Integumentary:  Negative for rash.   Neurological:  Negative for  dizziness and headaches.   Hematological:  Does not bruise/bleed easily.   Psychiatric/Behavioral:  Negative for dysphoric mood and sleep disturbance.          Past Medical History:   Diagnosis Date    Colon polyps      Past Surgical History:   Procedure Laterality Date    COLONOSCOPY N/A 5/24/2017    Procedure: COLONOSCOPY;  Surgeon: Adan Scott MD;  [facility]  Service: Endoscopy;  Laterality: N/A;  please schedule as VERY FIRST CASE OF THE DAY with me    COLONOSCOPY N/A 11/8/2021    Procedure: COLONOSCOPY;  Surgeon: Adan Scott MD;  Location: [facility] ([address] [place] [address]);  Service: Endoscopy;  Laterality: N/A;  last colonoscopy on 5/24/17-repeat in 3 yrs per Dr Scott. order placed      COVID test at [facility] on 11/5-GT      Patient Active Problem List   Diagnosis    Encounter for screening colonoscopy    Villous adenoma of colon    Family history of colon cancer in father    Vitamin D deficiency    Lung nodule < 6cm on CT    Elevated glucose level    Hyperlipidemia        Objective:      Physical Exam  Constitutional:       General: He is not in acute distress.     Appearance: He is well-developed.   HENT:      Head: Normocephalic and atraumatic.      Right Ear: Tympanic membrane, ear canal and external ear normal.      Left Ear: Tympanic membrane, ear canal and external ear normal.      Mouth/Throat:      Pharynx: No oropharyngeal exudate or posterior oropharyngeal erythema.   Eyes:      General: No scleral icterus.     Conjunctiva/sclera: Conjunctivae normal.      Pupils: Pupils are equal, round, and reactive to light.   Neck:      Thyroid: No thyromegaly.      Comments: No supraclavicular nodes palpated  Cardiovascular:      Rate and Rhythm: Normal rate and regular rhythm.      Pulses: Normal pulses.      Heart sounds: Normal heart sounds. No murmur heard.  Pulmonary:      Effort: Pulmonary effort is normal.      Breath sounds: Normal breath sounds. No wheezing.  "  Abdominal:      General: Bowel sounds are normal.      Palpations: Abdomen is soft. There is no mass.      Tenderness: There is no abdominal tenderness.   Genitourinary:     Comments: Pt deferred    Musculoskeletal:         General: No tenderness.      Cervical back: Normal range of motion and neck supple.      Right lower leg: No edema.      Left lower leg: No edema.   Lymphadenopathy:      Cervical: No cervical adenopathy.   Skin:     Coloration: Skin is not jaundiced or pale.   Neurological:      General: No focal deficit present.      Mental Status: He is alert and oriented to person, place, and time.   Psychiatric:         Mood and Affect: Mood normal.         Behavior: Behavior normal.       Assessment:       Problem List Items Addressed This Visit          Cardiac/Vascular    Hyperlipidemia       Endocrine    Vitamin D deficiency    Elevated glucose level     Other Visit Diagnoses       Routine physical examination    -  Primary    Tobacco abuse disorder        Relevant Orders    CT Chest Lung Screening Low Dose            Plan:         Porter was seen today for annual exam.    Diagnoses and all orders for this visit:    Routine physical examination    Vitamin D deficiency    Hyperlipidemia, unspecified hyperlipidemia type    Elevated glucose level    Tobacco abuse disorder  -     CT Chest Lung Screening Low Dose; Future    Other orders  -     triamcinolone acetonide 0.1% (KENALOG) 0.1 % cream; Apply topically 2 (two) times daily.            Review pending studies including EKG CT.  Likely will do a CT next year as well for continued screening as a former smoker and tiny nodules    CT scan is stable. Repeat 1 year.   Also EKG stable. Follow up 1 year.       Portions of this note may have been created with voice recognition software. Occasional "wrong-word" or "sound-a-like" substitutions may have occurred due to the inherent limitations of voice recognition software. Please, read the note carefully and " recognize, using context, where substitutions have occurred.       Labs:  Results for orders placed or performed in visit on 06/12/23   Comprehensive metabolic panel   Result Value Ref Range    Sodium 141 136 - 145 mmol/L    Potassium 4.1 3.5 - 5.1 mmol/L    Chloride 109 95 - 110 mmol/L    CO2 25 23 - 29 mmol/L    Glucose 126 (H) 70 - 110 mg/dL    BUN 15 6 - 20 mg/dL    Creatinine 0.9 0.5 - 1.4 mg/dL    Calcium 9.3 8.7 - 10.5 mg/dL    Total Protein 6.7 6.0 - 8.4 g/dL    Albumin 3.8 3.5 - 5.2 g/dL    Total Bilirubin 0.6 0.1 - 1.0 mg/dL    Alkaline Phosphatase 62 55 - 135 U/L    AST 21 10 - 40 U/L    ALT 21 10 - 44 U/L    Anion Gap 7 (L) 8 - 16 mmol/L    eGFR >60.0 >60 mL/min/1.73 m^2   CBC Without Differential   Result Value Ref Range    WBC 5.97 3.90 - 12.70 K/uL    RBC 5.01 4.60 - 6.20 M/uL    Hemoglobin 15.2 14.0 - 18.0 g/dL    Hematocrit 44.5 40.0 - 54.0 %    MCV 89 82 - 98 fL    MCH 30.3 27.0 - 31.0 pg    MCHC 34.2 32.0 - 36.0 g/dL    RDW 12.0 11.5 - 14.5 %    Platelets 171 150 - 450 K/uL    MPV 11.0 9.2 - 12.9 fL   Lipid panel   Result Value Ref Range    Cholesterol 224 (H) 120 - 199 mg/dL    Triglycerides 112 30 - 150 mg/dL    HDL 48 40 - 75 mg/dL    LDL Cholesterol 153.6 63.0 - 159.0 mg/dL    HDL/Cholesterol Ratio 21.4 20.0 - 50.0 %    Total Cholesterol/HDL Ratio 4.7 2.0 - 5.0    Non-HDL Cholesterol 176 mg/dL   TSH   Result Value Ref Range    TSH 1.402 0.400 - 4.000 uIU/mL   PSA, Screening (every year)   Result Value Ref Range    PSA, Screen 0.32 0.00 - 4.00 ng/mL   Hemoglobin A1c   Result Value Ref Range    Hemoglobin A1C 5.8 (H) 4.0 - 5.6 %    Estimated Avg Glucose 120 68 - 131 mg/dL   Vitamin D   Result Value Ref Range    Vit D, 25-Hydroxy 21 (L) 30 - 96 ng/mL        Assessment/Recommendations:  Routine Health Maintenance    At this time, you appear to be in good medical condition.  I look forward to seeing you again next year.  Please contact me should you have any questions or concerns regarding physical  findings, or my recommendations.              If you have any questions or concerns, please don't hesitate to call.    Sincerely,        Zeeshan Ramos MD

## 2023-06-12 NOTE — PROGRESS NOTES
Subjective:       Patient ID: Porter Rice is a 58 y.o. male.    Chief Complaint: Annual Exam    HPI:  Patient here for executive health physical.  Overall he is doing well.  He is working on weight reduction.  His lipids and sugar averages have improved since last year.  He is caring for elderly family members both himself and his wife.  That has been somewhat physically and emotionally stressful but it looks like things are improving / stabilizing.    His preliminary EKG result look stable.  Waiting on his chest CT scan   Labs reviewed in detail including PSA, A1c, lipids, chemistry and blood count.  Still with mild elevated lipids the patient wishes to keep working on weight and continue to monitor.  Blood pressure is stable.  Likely colon screening next year    Review of Systems   Constitutional:  Negative for chills, fatigue and fever.   HENT:  Negative for nosebleeds and trouble swallowing.    Eyes:  Negative for pain and visual disturbance.   Respiratory:  Negative for cough, shortness of breath and wheezing.    Cardiovascular:  Negative for chest pain and palpitations.   Gastrointestinal:  Negative for abdominal pain, constipation, diarrhea, nausea and vomiting.   Genitourinary:  Negative for difficulty urinating and hematuria.   Musculoskeletal:  Negative for arthralgias, back pain and neck pain.   Integumentary:  Negative for rash.   Neurological:  Negative for dizziness and headaches.   Hematological:  Does not bruise/bleed easily.   Psychiatric/Behavioral:  Negative for dysphoric mood and sleep disturbance.          Past Medical History:   Diagnosis Date    Colon polyps      Past Surgical History:   Procedure Laterality Date    COLONOSCOPY N/A 5/24/2017    Procedure: COLONOSCOPY;  Surgeon: Adan Scott MD;  Location: [address];  Service: Endoscopy;  Laterality: N/A;  please schedule as VERY FIRST CASE OF THE DAY with me    COLONOSCOPY N/A 11/8/2021    Procedure: COLONOSCOPY;  Surgeon:  Adan Scott MD;  Location: [facility] ([address] [place] [address]);  Service: Endoscopy;  Laterality: N/A;  last colonoscopy on 5/24/17-repeat in 3 yrs per Dr Scott. order placed      COVID test at [facility] on 11/5-GT      Patient Active Problem List   Diagnosis    Encounter for screening colonoscopy    Villous adenoma of colon    Family history of colon cancer in father    Vitamin D deficiency    Lung nodule < 6cm on CT    Elevated glucose level    Hyperlipidemia        Objective:      Physical Exam  Constitutional:       General: He is not in acute distress.     Appearance: He is well-developed.   HENT:      Head: Normocephalic and atraumatic.      Right Ear: Tympanic membrane, ear canal and external ear normal.      Left Ear: Tympanic membrane, ear canal and external ear normal.      Mouth/Throat:      Pharynx: No oropharyngeal exudate or posterior oropharyngeal erythema.   Eyes:      General: No scleral icterus.     Conjunctiva/sclera: Conjunctivae normal.      Pupils: Pupils are equal, round, and reactive to light.   Neck:      Thyroid: No thyromegaly.      Comments: No supraclavicular nodes palpated  Cardiovascular:      Rate and Rhythm: Normal rate and regular rhythm.      Pulses: Normal pulses.      Heart sounds: Normal heart sounds. No murmur heard.  Pulmonary:      Effort: Pulmonary effort is normal.      Breath sounds: Normal breath sounds. No wheezing.   Abdominal:      General: Bowel sounds are normal.      Palpations: Abdomen is soft. There is no mass.      Tenderness: There is no abdominal tenderness.   Genitourinary:     Comments: Pt deferred    Musculoskeletal:         General: No tenderness.      Cervical back: Normal range of motion and neck supple.      Right lower leg: No edema.      Left lower leg: No edema.   Lymphadenopathy:      Cervical: No cervical adenopathy.   Skin:     Coloration: Skin is not jaundiced or pale.   Neurological:      General: No focal deficit present.      Mental Status: He is  "alert and oriented to person, place, and time.   Psychiatric:         Mood and Affect: Mood normal.         Behavior: Behavior normal.       Assessment:       Problem List Items Addressed This Visit          Cardiac/Vascular    Hyperlipidemia       Endocrine    Vitamin D deficiency    Elevated glucose level     Other Visit Diagnoses       Routine physical examination    -  Primary    Tobacco abuse disorder        Relevant Orders    CT Chest Lung Screening Low Dose            Plan:         Porter was seen today for annual exam.    Diagnoses and all orders for this visit:    Routine physical examination    Vitamin D deficiency  Comments:  Resume Vit D supplementation.     Hyperlipidemia, unspecified hyperlipidemia type    Elevated glucose level    Tobacco abuse disorder  -     CT Chest Lung Screening Low Dose; Future    Other orders  -     triamcinolone acetonide 0.1% (KENALOG) 0.1 % cream; Apply topically 2 (two) times daily.            Review pending studies including EKG CT.  Likely will do a CT next year as well for continued screening as a former smoker and tiny nodules    CT scan is stable. Repeat 1 year.   Also EKG stable. Follow up 1 year.       Portions of this note may have been created with voice recognition software. Occasional "wrong-word" or "sound-a-like" substitutions may have occurred due to the inherent limitations of voice recognition software. Please, read the note carefully and recognize, using context, where substitutions have occurred.  "

## 2023-06-12 NOTE — PROGRESS NOTES
[facility]  Infomred Consent signed per pt,  per [facility] for [facility] - Lung study  Labs drawn per [facility]

## 2023-06-12 NOTE — PROGRESS NOTES
Sponsor: [employer]    Study Title/IRB Number: [id number]   CASCADE-LUNG: Cancer Screening Assay using [employer]; A Clinical Validation Study in Lung    Principle Investigator: Kaden Levy MD    Prior to the Informed Consent (IC) being signed, or any study protocol required data collection, testing, procedure, or intervention being performed, the following was done and/or discussed:  Patient was given a copy of the IC for review   Purpose of the study and qualifications to participate   Study design, Follow up schedule, and tests or procedures done at each visit  Confidentiality and HIPAA Authorization for Release of Medical Records for the research trial/ subject's rights/research related injury  Risk, Benefits, Alternative Treatments, Compensation and Costs  Participation in the research trial is voluntary and patient may withdraw at anytime  Contact information for study related questions    Patient verbalizes understanding of the above: Yes  Contact information for CRC and PI given to patient: Yes  Patient able to adequately summarize: the purpose of the study, the risks associated with the study, and all procedures, testing, and follow-ups associated with the study: Yes    Each page of the consent form was reviewed with patient and all questions answered satisfactorily. Patient signed the informed consent form for the research study with an IRB approval   Patient received a copy of the consent form. The original consent was scanned into electronic medical records.    No study procedures done prior to signing of consent.    Blood collection done as per protocol.

## 2024-05-17 DIAGNOSIS — Z00.00 ROUTINE MEDICAL EXAM: Primary | ICD-10-CM

## 2024-06-06 ENCOUNTER — OFFICE VISIT (OUTPATIENT)
Dept: INTERNAL MEDICINE | Facility: CLINIC | Age: 59
End: 2024-06-06
Payer: COMMERCIAL

## 2024-06-06 ENCOUNTER — HOSPITAL ENCOUNTER (OUTPATIENT)
Dept: RADIOLOGY | Facility: HOSPITAL | Age: 59
Discharge: HOME | End: 2024-06-06
Attending: INTERNAL MEDICINE
Payer: COMMERCIAL

## 2024-06-06 ENCOUNTER — HOSPITAL ENCOUNTER (OUTPATIENT)
Dept: CARDIOLOGY | Facility: CLINIC | Age: 59
Discharge: HOME | End: 2024-06-06
Payer: COMMERCIAL

## 2024-06-06 ENCOUNTER — CLINICAL SUPPORT (OUTPATIENT)
Dept: INTERNAL MEDICINE | Facility: CLINIC | Age: 59
End: 2024-06-06
Payer: COMMERCIAL

## 2024-06-06 VITALS
DIASTOLIC BLOOD PRESSURE: 70 MMHG | HEIGHT: 70 IN | SYSTOLIC BLOOD PRESSURE: 108 MMHG | WEIGHT: 222 LBS | HEART RATE: 65 BPM | OXYGEN SATURATION: 98 % | BODY MASS INDEX: 31.78 KG/M2

## 2024-06-06 DIAGNOSIS — Z00.00 ROUTINE GENERAL MEDICAL EXAMINATION AT A HEALTH CARE FACILITY: Primary | ICD-10-CM

## 2024-06-06 DIAGNOSIS — Z72.0 TOBACCO ABUSE DISORDER: ICD-10-CM

## 2024-06-06 DIAGNOSIS — B35.6 TINEA CRURIS: ICD-10-CM

## 2024-06-06 DIAGNOSIS — Z00.00 ROUTINE MEDICAL EXAM: ICD-10-CM

## 2024-06-06 DIAGNOSIS — R73.09 ELEVATED GLUCOSE LEVEL: ICD-10-CM

## 2024-06-06 DIAGNOSIS — E55.9 VITAMIN D DEFICIENCY: ICD-10-CM

## 2024-06-06 DIAGNOSIS — E78.5 HYPERLIPIDEMIA, UNSPECIFIED HYPERLIPIDEMIA TYPE: ICD-10-CM

## 2024-06-06 DIAGNOSIS — Z00.00 ROUTINE PHYSICAL EXAMINATION: Primary | ICD-10-CM

## 2024-06-06 LAB
25(OH)D3+25(OH)D2 SERPL-MCNC: 22 NG/ML (ref 30–96)
ALBUMIN SERPL BCP-MCNC: 3.9 G/DL (ref 3.5–5.2)
ALP SERPL-CCNC: 70 U/L (ref 55–135)
ALT SERPL W/O P-5'-P-CCNC: 33 U/L (ref 10–44)
ANION GAP SERPL CALC-SCNC: 8 MMOL/L (ref 8–16)
AST SERPL-CCNC: 24 U/L (ref 10–40)
BILIRUB SERPL-MCNC: 0.4 MG/DL (ref 0.1–1)
BUN SERPL-MCNC: 15 MG/DL (ref 6–20)
CALCIUM SERPL-MCNC: 9.8 MG/DL (ref 8.7–10.5)
CHLORIDE SERPL-SCNC: 108 MMOL/L (ref 95–110)
CHOLEST SERPL-MCNC: 241 MG/DL (ref 120–199)
CHOLEST/HDLC SERPL: 4.8 {RATIO} (ref 2–5)
CO2 SERPL-SCNC: 26 MMOL/L (ref 23–29)
COMPLEXED PSA SERPL-MCNC: 0.51 NG/ML (ref 0–4)
CREAT SERPL-MCNC: 1 MG/DL (ref 0.5–1.4)
ERYTHROCYTE [DISTWIDTH] IN BLOOD BY AUTOMATED COUNT: 12.1 % (ref 11.5–14.5)
EST. GFR  (NO RACE VARIABLE): >60 ML/MIN/1.73 M^2
ESTIMATED AVG GLUCOSE: 137 MG/DL (ref 68–131)
GLUCOSE SERPL-MCNC: 143 MG/DL (ref 70–110)
HBA1C MFR BLD: 6.4 % (ref 4–5.6)
HCT VFR BLD AUTO: 50.3 % (ref 40–54)
HDLC SERPL-MCNC: 50 MG/DL (ref 40–75)
HDLC SERPL: 20.7 % (ref 20–50)
HGB BLD-MCNC: 16.3 G/DL (ref 14–18)
LDLC SERPL CALC-MCNC: 164.2 MG/DL (ref 63–159)
MCH RBC QN AUTO: 30.9 PG (ref 27–31)
MCHC RBC AUTO-ENTMCNC: 32.4 G/DL (ref 32–36)
MCV RBC AUTO: 95 FL (ref 82–98)
NONHDLC SERPL-MCNC: 191 MG/DL
OHS QRS DURATION: 92 MS
OHS QTC CALCULATION: 405 MS
PLATELET # BLD AUTO: 194 K/UL (ref 150–450)
PMV BLD AUTO: 11.6 FL (ref 9.2–12.9)
POTASSIUM SERPL-SCNC: 4.4 MMOL/L (ref 3.5–5.1)
PROT SERPL-MCNC: 7.1 G/DL (ref 6–8.4)
RBC # BLD AUTO: 5.28 M/UL (ref 4.6–6.2)
SODIUM SERPL-SCNC: 142 MMOL/L (ref 136–145)
TRIGL SERPL-MCNC: 134 MG/DL (ref 30–150)
TSH SERPL DL<=0.005 MIU/L-ACNC: 1.64 UIU/ML (ref 0.4–4)
WBC # BLD AUTO: 6.08 K/UL (ref 3.9–12.7)

## 2024-06-06 PROCEDURE — 83036 HEMOGLOBIN GLYCOSYLATED A1C: CPT | Performed by: INTERNAL MEDICINE

## 2024-06-06 PROCEDURE — 80061 LIPID PANEL: CPT | Performed by: INTERNAL MEDICINE

## 2024-06-06 PROCEDURE — 71271 CT THORAX LUNG CANCER SCR C-: CPT | Mod: TC

## 2024-06-06 PROCEDURE — 1159F MED LIST DOCD IN RCRD: CPT | Mod: CPTII,S$GLB,, | Performed by: INTERNAL MEDICINE

## 2024-06-06 PROCEDURE — 36415 COLL VENOUS BLD VENIPUNCTURE: CPT | Performed by: INTERNAL MEDICINE

## 2024-06-06 PROCEDURE — 99999 PR PBB SHADOW E&M-EST. PATIENT-LVL I: CPT | Mod: PBBFAC,,,

## 2024-06-06 PROCEDURE — 84153 ASSAY OF PSA TOTAL: CPT | Performed by: INTERNAL MEDICINE

## 2024-06-06 PROCEDURE — 3078F DIAST BP <80 MM HG: CPT | Mod: CPTII,S$GLB,, | Performed by: INTERNAL MEDICINE

## 2024-06-06 PROCEDURE — 3044F HG A1C LEVEL LT 7.0%: CPT | Mod: CPTII,S$GLB,, | Performed by: INTERNAL MEDICINE

## 2024-06-06 PROCEDURE — 82306 VITAMIN D 25 HYDROXY: CPT | Performed by: INTERNAL MEDICINE

## 2024-06-06 PROCEDURE — 1160F RVW MEDS BY RX/DR IN RCRD: CPT | Mod: CPTII,S$GLB,, | Performed by: INTERNAL MEDICINE

## 2024-06-06 PROCEDURE — 93010 ELECTROCARDIOGRAM REPORT: CPT | Mod: S$GLB,,, | Performed by: INTERNAL MEDICINE

## 2024-06-06 PROCEDURE — 84443 ASSAY THYROID STIM HORMONE: CPT | Performed by: INTERNAL MEDICINE

## 2024-06-06 PROCEDURE — 80053 COMPREHEN METABOLIC PANEL: CPT | Performed by: INTERNAL MEDICINE

## 2024-06-06 PROCEDURE — 85027 COMPLETE CBC AUTOMATED: CPT | Performed by: INTERNAL MEDICINE

## 2024-06-06 PROCEDURE — 93005 ELECTROCARDIOGRAM TRACING: CPT | Mod: S$GLB,,, | Performed by: INTERNAL MEDICINE

## 2024-06-06 PROCEDURE — 99999 PR PBB SHADOW E&M-EST. PATIENT-LVL III: CPT | Mod: PBBFAC,,, | Performed by: INTERNAL MEDICINE

## 2024-06-06 PROCEDURE — 99396 PREV VISIT EST AGE 40-64: CPT | Mod: S$GLB,,, | Performed by: INTERNAL MEDICINE

## 2024-06-06 PROCEDURE — 3008F BODY MASS INDEX DOCD: CPT | Mod: CPTII,S$GLB,, | Performed by: INTERNAL MEDICINE

## 2024-06-06 PROCEDURE — 71271 CT THORAX LUNG CANCER SCR C-: CPT | Mod: 26,,, | Performed by: STUDENT IN AN ORGANIZED HEALTH CARE EDUCATION/TRAINING PROGRAM

## 2024-06-06 PROCEDURE — 3074F SYST BP LT 130 MM HG: CPT | Mod: CPTII,S$GLB,, | Performed by: INTERNAL MEDICINE

## 2024-06-06 RX ORDER — CLOTRIMAZOLE AND BETAMETHASONE DIPROPIONATE 10; .64 MG/G; MG/G
CREAM TOPICAL 2 TIMES DAILY
Qty: 45 G | Refills: 2 | Status: SHIPPED | OUTPATIENT
Start: 2024-06-06

## 2024-06-06 NOTE — PROGRESS NOTES
Subjective:       Patient ID: Porter Rice is a 59 y.o. male.    Chief Complaint: Annual Exam    Patient here for [employer] physical.  Generally doing well.  He has been renovating his house for months and is not eating as well, not exercising and he was not surprised that his glucose and lipids went up a bit.  Other labs reviewed and stable.  EKG stable.  We will follow this up carefully.  We talked about he is a candidate for statin but he is not interested at this time.   No change in family history.  He is having some jock itch and wanted me to look at the left groin crease.  Over-the-counter Lotrimin is not helping very much.  A lot of itching and excoriations from scratching      Review of Systems   Constitutional:  Negative for chills, fatigue and fever.   HENT:  Negative for nosebleeds and trouble swallowing.    Eyes:  Negative for pain and visual disturbance.   Respiratory:  Negative for cough, shortness of breath and wheezing.    Cardiovascular:  Negative for chest pain and palpitations.   Gastrointestinal:  Negative for abdominal pain, constipation, diarrhea, nausea and vomiting.   Genitourinary:  Negative for difficulty urinating and hematuria.   Musculoskeletal:  Negative for arthralgias, back pain and neck pain.   Integumentary:  Positive for rash.   Neurological:  Negative for dizziness and headaches.   Hematological:  Does not bruise/bleed easily.   Psychiatric/Behavioral:  Negative for dysphoric mood and sleep disturbance.            Past Medical History:   Diagnosis Date    Colon polyps      Past Surgical History:   Procedure Laterality Date    COLONOSCOPY N/A 5/24/2017    Procedure: COLONOSCOPY;  Surgeon: Adan Scott MD;  Location: [address]);  Service: Endoscopy;  Laterality: N/A;  please schedule as VERY FIRST CASE OF THE DAY with me    COLONOSCOPY N/A 11/8/2021    Procedure: COLONOSCOPY;  Surgeon: Adan Scott MD;  Location: [facility] ([address] [place] [address]);  Service:  Endoscopy;  Laterality: N/A;  last colonoscopy on 5/24/17-repeat in 3 yrs per Dr Scott. order placed      COVID test at [facility] on 11/5-GT      Patient Active Problem List   Diagnosis    Encounter for screening colonoscopy    Villous adenoma of colon    Family history of colon cancer in father    Vitamin D deficiency    Lung nodule < 6cm on CT    Elevated glucose level    Hyperlipidemia        Objective:      Physical Exam  Constitutional:       General: He is not in acute distress.     Appearance: He is well-developed.   HENT:      Head: Normocephalic and atraumatic.      Right Ear: Tympanic membrane, ear canal and external ear normal.      Left Ear: Tympanic membrane, ear canal and external ear normal.      Mouth/Throat:      Pharynx: No oropharyngeal exudate or posterior oropharyngeal erythema.   Eyes:      General: No scleral icterus.     Conjunctiva/sclera: Conjunctivae normal.      Pupils: Pupils are equal, round, and reactive to light.   Neck:      Thyroid: No thyromegaly.      Comments: No supraclavicular nodes palpated  Cardiovascular:      Rate and Rhythm: Normal rate and regular rhythm.      Pulses: Normal pulses.      Heart sounds: Normal heart sounds. No murmur heard.  Pulmonary:      Effort: Pulmonary effort is normal.      Breath sounds: Normal breath sounds. No wheezing.   Abdominal:      General: Bowel sounds are normal.      Palpations: Abdomen is soft. There is no mass.      Tenderness: There is no abdominal tenderness.   Musculoskeletal:         General: No tenderness.      Cervical back: Normal range of motion and neck supple.      Right lower leg: No edema.      Left lower leg: No edema.   Lymphadenopathy:      Cervical: No cervical adenopathy.   Skin:     Coloration: Skin is not jaundiced or pale.      Findings: Rash (left groin redness, irritation, few excoriations from scratching.) present.   Neurological:      General: No focal deficit present.      Mental Status: He is alert and  oriented to person, place, and time.   Psychiatric:         Mood and Affect: Mood normal.         Behavior: Behavior normal.         Assessment:       Problem List Items Addressed This Visit          Cardiac/Vascular    Hyperlipidemia    Relevant Orders    Lipid Panel    Hemoglobin A1C       Endocrine    Vitamin D deficiency    Relevant Orders    Lipid Panel    Hemoglobin A1C    Elevated glucose level    Relevant Orders    Lipid Panel    Hemoglobin A1C     Other Visit Diagnoses       Routine physical examination    -  Primary    Relevant Orders    Lipid Panel    Hemoglobin A1C    Tinea cruris        Relevant Medications    clotrimazole-betamethasone 1-0.05% (LOTRISONE) cream            Plan:         Porter was seen today for annual exam.    Diagnoses and all orders for this visit:    Routine physical examination  -     Lipid Panel; Future  -     Hemoglobin A1C; Future    Vitamin D deficiency  -     Lipid Panel; Future  -     Hemoglobin A1C; Future    Hyperlipidemia, unspecified hyperlipidemia type  -     Lipid Panel; Future  -     Hemoglobin A1C; Future    Elevated glucose level  -     Lipid Panel; Future  -     Hemoglobin A1C; Future    Tinea cruris  -     clotrimazole-betamethasone 1-0.05% (LOTRISONE) cream; Apply topically 2 (two) times daily.           Vit D 1000 units daily     The 10-year ASCVD risk score (Kitty VOGT, et al., 2019) is: 7.7%    Values used to calculate the score:      Age: 59 years      Sex: Male      Is Non- : No      Diabetic: No      Tobacco smoker: No      Systolic Blood Pressure: 108 mmHg      Is BP treated: No      HDL Cholesterol: 50 mg/dL      Total Cholesterol: 241 mg/dL     EKG no change from 2023.  Labs reviewed including elevated A1c and lipids the patient has been renovating his house and has not been eating as healthy or exercising.  We discussed he would qualify for statin based on risk score above but for now he wishes to work on his diet for 3-4 months  "and we will reassess labs at that time   He will start vitamin-D daily  Waiting on CT scan final reading    Lung screening CT did not show any nodules or concerning findings.  Repeat in 1 year    Portions of this note may have been created with voice recognition software. Occasional "wrong-word" or "sound-a-like" substitutions may have occurred due to the inherent limitations of voice recognition software. Please, read the note carefully and recognize, using context, where substitutions have occurred.  "

## 2024-06-06 NOTE — LETTER
June 6, 2024    Porter Rice  [address]  [place] [address]             [facility] Ald[facility]  [address]  [facility] [address]  Phone: [phone]  Fax: [phone] Dear Mr. Rice:        Thank you for allowing me to serve you and perform your Executive Health exam on 6/6/2024.  This letter will serve a brief summary of the history, physical findings, and laboratory/studies performed and recommendations at that time.    Reason for Visit: Executive Health Preventive Physical Examination    Subjective:       Patient ID: Porter Rice is a 59 y.o. male.    Chief Complaint: Annual Exam    Patient here for executive health physical.  Generally doing well.  He has been renovating his house for months and is not eating as well, not exercising and he was not surprised that his glucose and lipids went up a bit.  Other labs reviewed and stable.  EKG stable.  We will follow this up carefully.  We talked about he is a candidate for statin but he is not interested at this time.   No change in family history.  He is having some jock itch and wanted me to look at the left groin crease.  Over-the-counter Lotrimin is not helping very much.  A lot of itching and excoriations from scratching      Review of Systems   Constitutional:  Negative for chills, fatigue and fever.   HENT:  Negative for nosebleeds and trouble swallowing.    Eyes:  Negative for pain and visual disturbance.   Respiratory:  Negative for cough, shortness of breath and wheezing.    Cardiovascular:  Negative for chest pain and palpitations.   Gastrointestinal:  Negative for abdominal pain, constipation, diarrhea, nausea and vomiting.   Genitourinary:  Negative for difficulty urinating and hematuria.   Musculoskeletal:  Negative for arthralgias, back pain and neck pain.   Integumentary:  Positive for rash.   Neurological:  Negative for dizziness and headaches.   Hematological:  Does not bruise/bleed easily.    Psychiatric/Behavioral:  Negative for dysphoric mood and sleep disturbance.            Past Medical History:   Diagnosis Date    Colon polyps      Past Surgical History:   Procedure Laterality Date    COLONOSCOPY N/A 5/24/2017    Procedure: COLONOSCOPY;  Surgeon: Adan Scott MD;  Location: [facility] ([address] [place] [address]);  Service: Endoscopy;  Laterality: N/A;  please schedule as VERY FIRST CASE OF THE DAY with me    COLONOSCOPY N/A 11/8/2021    Procedure: COLONOSCOPY;  Surgeon: Adan Scott MD;  Location: [facility] ([address] [place] [address]);  Service: Endoscopy;  Laterality: N/A;  last colonoscopy on 5/24/17-repeat in 3 yrs per Dr Scott. order placed      C[facility] on 11/5[facility]GT      Patient Active Problem List   Diagnosis    Encounter for screening colonoscopy    Villous adenoma of colon    Family history of colon cancer in father    Vitamin D deficiency    Lung nodule < 6cm on CT    Elevated glucose level    Hyperlipidemia        Objective:      Physical Exam  Constitutional:       General: He is not in acute distress.     Appearance: He is well-developed.   HENT:      Head: Normocephalic and atraumatic.      Right Ear: Tympanic membrane, ear canal and external ear normal.      Left Ear: Tympanic membrane, ear canal and external ear normal.      Mouth/Throat:      Pharynx: No oropharyngeal exudate or posterior oropharyngeal erythema.   Eyes:      General: No scleral icterus.     Conjunctiva/sclera: Conjunctivae normal.      Pupils: Pupils are equal, round, and reactive to light.   Neck:      Thyroid: No thyromegaly.      Comments: No supraclavicular nodes palpated  Cardiovascular:      Rate and Rhythm: Normal rate and regular rhythm.      Pulses: Normal pulses.      Heart sounds: Normal heart sounds. No murmur heard.  Pulmonary:      Effort: Pulmonary effort is normal.      Breath sounds: Normal breath sounds. No wheezing.   Abdominal:      General: Bowel sounds are normal.      Palpations: Abdomen  is soft. There is no mass.      Tenderness: There is no abdominal tenderness.   Musculoskeletal:         General: No tenderness.      Cervical back: Normal range of motion and neck supple.      Right lower leg: No edema.      Left lower leg: No edema.   Lymphadenopathy:      Cervical: No cervical adenopathy.   Skin:     Coloration: Skin is not jaundiced or pale.      Findings: Rash (left groin redness, irritation, few excoriations from scratching.) present.   Neurological:      General: No focal deficit present.      Mental Status: He is alert and oriented to person, place, and time.   Psychiatric:         Mood and Affect: Mood normal.         Behavior: Behavior normal.         Assessment:       Problem List Items Addressed This Visit          Cardiac/Vascular    Hyperlipidemia    Relevant Orders    Lipid Panel    Hemoglobin A1C       Endocrine    Vitamin D deficiency    Relevant Orders    Lipid Panel    Hemoglobin A1C    Elevated glucose level    Relevant Orders    Lipid Panel    Hemoglobin A1C     Other Visit Diagnoses       Routine physical examination    -  Primary    Relevant Orders    Lipid Panel    Hemoglobin A1C    Tinea cruris        Relevant Medications    clotrimazole-betamethasone 1-0.05% (LOTRISONE) cream            Plan:         Porter was seen today for annual exam.    Diagnoses and all orders for this visit:    Routine physical examination  -     Lipid Panel; Future  -     Hemoglobin A1C; Future    Vitamin D deficiency  -     Lipid Panel; Future  -     Hemoglobin A1C; Future    Hyperlipidemia, unspecified hyperlipidemia type  -     Lipid Panel; Future  -     Hemoglobin A1C; Future    Elevated glucose level  -     Lipid Panel; Future  -     Hemoglobin A1C; Future    Tinea cruris  -     clotrimazole-betamethasone 1-0.05% (LOTRISONE) cream; Apply topically 2 (two) times daily.           Vit D 1000 units daily     The 10-year ASCVD risk score (Kitty VOGT, et al., 2019) is: 7.7%    Values used to calculate  "the score:      Age: 59 years      Sex: Male      Is Non- : No      Diabetic: No      Tobacco smoker: No      Systolic Blood Pressure: 108 mmHg      Is BP treated: No      HDL Cholesterol: 50 mg/dL      Total Cholesterol: 241 mg/dL     EKG no change from 2023.  Labs reviewed including elevated A1c and lipids the patient has been renovating his house and has not been eating as healthy or exercising.  We discussed he would qualify for statin based on risk score above but for now he wishes to work on his diet for 3-4 months and we will reassess labs at that time   He will start vitamin-D daily  Waiting on CT scan final reading    Lung screening CT did not show any nodules or concerning findings.  Repeat in 1 year    Portions of this note may have been created with voice recognition software. Occasional "wrong-word" or "sound-a-like" substitutions may have occurred due to the inherent limitations of voice recognition software. Please, read the note carefully and recognize, using context, where substitutions have occurred.       Labs:  Component      Latest Ref Rng 6/6/2024   Sodium      136 - 145 mmol/L 142    Potassium      3.5 - 5.1 mmol/L 4.4    Chloride      95 - 110 mmol/L 108    CO2      23 - 29 mmol/L 26    Glucose      70 - 110 mg/dL 143 (H)    BUN      6 - 20 mg/dL 15    Creatinine      0.5 - 1.4 mg/dL 1.0    Calcium      8.7 - 10.5 mg/dL 9.8    PROTEIN TOTAL      6.0 - 8.4 g/dL 7.1    Albumin      3.5 - 5.2 g/dL 3.9    BILIRUBIN TOTAL      0.1 - 1.0 mg/dL 0.4    ALP      55 - 135 U/L 70    AST      10 - 40 U/L 24    ALT      10 - 44 U/L 33    eGFR      >60 mL/min/1.73 m^2 >60.0    Anion Gap      8 - 16 mmol/L 8    WBC      3.90 - 12.70 K/uL 6.08    RBC      4.60 - 6.20 M/uL 5.28    Hemoglobin      14.0 - 18.0 g/dL 16.3    Hematocrit      40.0 - 54.0 % 50.3    MCV      82 - 98 fL 95    MCH      27.0 - 31.0 pg 30.9    MCHC      32.0 - 36.0 g/dL 32.4    RDW      11.5 - 14.5 % 12.1  "   Platelet Count      150 - 450 K/uL 194    MPV      9.2 - 12.9 fL 11.6    Cholesterol Total      120 - 199 mg/dL 241 (H)    Triglycerides      30 - 150 mg/dL 134    HDL      40 - 75 mg/dL 50    LDL Cholesterol      63.0 - 159.0 mg/dL 164.2 (H)    HDL/Cholesterol Ratio      20.0 - 50.0 % 20.7    Total Cholesterol/HDL Ratio      2.0 - 5.0  4.8    Non-HDL Cholesterol      mg/dL 191    Hemoglobin A1C External      4.0 - 5.6 % 6.4 (H)    Estimated Avg Glucose      68 - 131 mg/dL 137 (H)    TSH      0.400 - 4.000 uIU/mL 1.637    Prostate Specific Antigen      0.00 - 4.00 ng/mL 0.51    Vitamin D      30 - 96 ng/mL 22 (L)       Legend:  (H) High  (L) Low    Results for orders placed or performed during the hospital encounter of 06/06/24   EKG 12-lead   Result Value Ref Range    QRS Duration 92 ms    [facility] QTC Calculation 405 ms        Assessment/Recommendations:  Routine Health Maintenance    At this time, you appear to be in good medical condition.  I look forward to seeing you again next year.  Please contact me should you have any questions or concerns regarding physical findings, or my recommendations.              If you have any questions or concerns, please don't hesitate to call.    Sincerely,        Zeeshan Ramos MD

## 2024-06-10 ENCOUNTER — PATIENT MESSAGE (OUTPATIENT)
Dept: INTERNAL MEDICINE | Facility: CLINIC | Age: 59
End: 2024-06-10
Payer: COMMERCIAL

## 2024-11-04 ENCOUNTER — LAB VISIT (OUTPATIENT)
Dept: LAB | Facility: HOSPITAL | Age: 59
End: 2024-11-04
Attending: INTERNAL MEDICINE
Payer: COMMERCIAL

## 2024-11-04 DIAGNOSIS — E55.9 VITAMIN D DEFICIENCY: ICD-10-CM

## 2024-11-04 DIAGNOSIS — E78.5 HYPERLIPIDEMIA, UNSPECIFIED HYPERLIPIDEMIA TYPE: ICD-10-CM

## 2024-11-04 DIAGNOSIS — R73.09 ELEVATED GLUCOSE LEVEL: ICD-10-CM

## 2024-11-04 DIAGNOSIS — Z00.00 ROUTINE PHYSICAL EXAMINATION: ICD-10-CM

## 2024-11-04 LAB
CHOLEST SERPL-MCNC: 228 MG/DL (ref 120–199)
CHOLEST/HDLC SERPL: 4.9 {RATIO} (ref 2–5)
ESTIMATED AVG GLUCOSE: 140 MG/DL (ref 68–131)
HBA1C MFR BLD: 6.5 % (ref 4–5.6)
HDLC SERPL-MCNC: 47 MG/DL (ref 40–75)
HDLC SERPL: 20.6 % (ref 20–50)
LDLC SERPL CALC-MCNC: 151.2 MG/DL (ref 63–159)
NONHDLC SERPL-MCNC: 181 MG/DL
TRIGL SERPL-MCNC: 149 MG/DL (ref 30–150)

## 2024-11-04 PROCEDURE — 80061 LIPID PANEL: CPT | Performed by: INTERNAL MEDICINE

## 2024-11-04 PROCEDURE — 83036 HEMOGLOBIN GLYCOSYLATED A1C: CPT | Performed by: INTERNAL MEDICINE

## 2024-11-04 PROCEDURE — 36415 COLL VENOUS BLD VENIPUNCTURE: CPT | Performed by: INTERNAL MEDICINE

## 2024-11-10 ENCOUNTER — PATIENT MESSAGE (OUTPATIENT)
Dept: INTERNAL MEDICINE | Facility: CLINIC | Age: 59
End: 2024-11-10
Payer: COMMERCIAL

## 2025-04-23 DIAGNOSIS — Z00.00 ROUTINE MEDICAL EXAM: Primary | ICD-10-CM

## 2025-07-10 ENCOUNTER — HOSPITAL ENCOUNTER (OUTPATIENT)
Dept: RADIOLOGY | Facility: HOSPITAL | Age: 60
Discharge: HOME | End: 2025-07-10
Attending: INTERNAL MEDICINE
Payer: COMMERCIAL

## 2025-07-10 ENCOUNTER — HOSPITAL ENCOUNTER (OUTPATIENT)
Dept: CARDIOLOGY | Facility: HOSPITAL | Age: 60
Discharge: HOME | End: 2025-07-10
Attending: INTERNAL MEDICINE
Payer: COMMERCIAL

## 2025-07-10 ENCOUNTER — CLINICAL SUPPORT (OUTPATIENT)
Dept: INTERNAL MEDICINE | Facility: CLINIC | Age: 60
End: 2025-07-10
Attending: INTERNAL MEDICINE
Payer: COMMERCIAL

## 2025-07-10 VITALS
HEART RATE: 56 BPM | SYSTOLIC BLOOD PRESSURE: 138 MMHG | DIASTOLIC BLOOD PRESSURE: 62 MMHG | BODY MASS INDEX: 31.78 KG/M2 | HEIGHT: 70 IN | WEIGHT: 222 LBS

## 2025-07-10 VITALS
HEIGHT: 70 IN | SYSTOLIC BLOOD PRESSURE: 122 MMHG | OXYGEN SATURATION: 98 % | WEIGHT: 222.25 LBS | HEART RATE: 81 BPM | BODY MASS INDEX: 31.82 KG/M2 | DIASTOLIC BLOOD PRESSURE: 82 MMHG

## 2025-07-10 DIAGNOSIS — E11.65 TYPE 2 DIABETES MELLITUS WITH HYPERGLYCEMIA, WITHOUT LONG-TERM CURRENT USE OF INSULIN: ICD-10-CM

## 2025-07-10 DIAGNOSIS — E55.9 VITAMIN D DEFICIENCY: ICD-10-CM

## 2025-07-10 DIAGNOSIS — Z00.00 ROUTINE MEDICAL EXAM: ICD-10-CM

## 2025-07-10 DIAGNOSIS — B35.6 TINEA CRURIS: ICD-10-CM

## 2025-07-10 DIAGNOSIS — Z00.00 ROUTINE PHYSICAL EXAMINATION: Primary | ICD-10-CM

## 2025-07-10 DIAGNOSIS — E78.5 HYPERLIPIDEMIA, UNSPECIFIED HYPERLIPIDEMIA TYPE: ICD-10-CM

## 2025-07-10 DIAGNOSIS — Z00.00 ROUTINE GENERAL MEDICAL EXAMINATION AT A HEALTH CARE FACILITY: Primary | ICD-10-CM

## 2025-07-10 LAB
25(OH)D3+25(OH)D2 SERPL-MCNC: 20 NG/ML (ref 30–96)
ALBUMIN SERPL BCP-MCNC: 4.2 G/DL (ref 3.5–5.2)
ALP SERPL-CCNC: 64 UNIT/L (ref 40–150)
ALT SERPL W/O P-5'-P-CCNC: 56 UNIT/L (ref 10–44)
ANION GAP (OHS): 10 MMOL/L (ref 8–16)
AST SERPL-CCNC: 45 UNIT/L (ref 11–45)
BILIRUB SERPL-MCNC: 0.6 MG/DL (ref 0.1–1)
BUN SERPL-MCNC: 15 MG/DL (ref 8–23)
CALCIUM SERPL-MCNC: 9.7 MG/DL (ref 8.7–10.5)
CHLORIDE SERPL-SCNC: 108 MMOL/L (ref 95–110)
CHOLEST SERPL-MCNC: 263 MG/DL (ref 120–199)
CHOLEST/HDLC SERPL: 5.8 {RATIO} (ref 2–5)
CO2 SERPL-SCNC: 26 MMOL/L (ref 23–29)
CREAT SERPL-MCNC: 0.8 MG/DL (ref 0.5–1.4)
CV STRESS BASE HR: 56 BPM
DIASTOLIC BLOOD PRESSURE: 62 MMHG
EAG (OHS): 160 MG/DL (ref 68–131)
ERYTHROCYTE [DISTWIDTH] IN BLOOD BY AUTOMATED COUNT: 12.2 % (ref 11.5–14.5)
GFR SERPLBLD CREATININE-BSD FMLA CKD-EPI: >60 ML/MIN/1.73/M2
GLUCOSE SERPL-MCNC: 160 MG/DL (ref 70–110)
HBA1C MFR BLD: 7.2 % (ref 4–5.6)
HCT VFR BLD AUTO: 48.4 % (ref 40–54)
HDLC SERPL-MCNC: 45 MG/DL (ref 40–75)
HDLC SERPL: 17.1 % (ref 20–50)
HGB BLD-MCNC: 16.1 GM/DL (ref 14–18)
LDLC SERPL CALC-MCNC: 186 MG/DL (ref 63–159)
MCH RBC QN AUTO: 30.7 PG (ref 27–31)
MCHC RBC AUTO-ENTMCNC: 33.3 G/DL (ref 32–36)
MCV RBC AUTO: 92 FL (ref 82–98)
NONHDLC SERPL-MCNC: 218 MG/DL
OHS CV CPX 1 MINUTE RECOVERY HEART RATE: 117 BPM
OHS CV CPX 85 PERCENT MAX PREDICTED HEART RATE MALE: 135
OHS CV CPX ESTIMATED METS: 12
OHS CV CPX MAX PREDICTED HEART RATE: 159
OHS CV CPX PATIENT IS FEMALE: 0
OHS CV CPX PATIENT IS MALE: 1
OHS CV CPX PEAK DIASTOLIC BLOOD PRESSURE: 59 MMHG
OHS CV CPX PEAK HEAR RATE: 150 BPM
OHS CV CPX PEAK RATE PRESSURE PRODUCT: NORMAL
OHS CV CPX PEAK SYSTOLIC BLOOD PRESSURE: 192 MMHG
OHS CV CPX PERCENT MAX PREDICTED HEART RATE ACHIEVED: 94
OHS CV CPX RATE PRESSURE PRODUCT PRESENTING: 7728
PLATELET # BLD AUTO: 168 K/UL (ref 150–450)
PMV BLD AUTO: 10.8 FL (ref 9.2–12.9)
POTASSIUM SERPL-SCNC: 5 MMOL/L (ref 3.5–5.1)
PROT SERPL-MCNC: 7.4 GM/DL (ref 6–8.4)
PSA SERPL-MCNC: 0.8 NG/ML
RBC # BLD AUTO: 5.25 M/UL (ref 4.6–6.2)
SODIUM SERPL-SCNC: 144 MMOL/L (ref 136–145)
STRESS ECHO POST EXERCISE DUR MIN: 7 MINUTES
STRESS ECHO POST EXERCISE DUR SEC: 14 SECONDS
SYSTOLIC BLOOD PRESSURE: 138 MMHG
TRIGL SERPL-MCNC: 160 MG/DL (ref 30–150)
TSH SERPL-ACNC: 1.75 UIU/ML (ref 0.4–4)
WBC # BLD AUTO: 5.62 K/UL (ref 3.9–12.7)

## 2025-07-10 PROCEDURE — 71271 CT THORAX LUNG CANCER SCR C-: CPT | Mod: TC

## 2025-07-10 PROCEDURE — 85027 COMPLETE CBC AUTOMATED: CPT

## 2025-07-10 PROCEDURE — 84443 ASSAY THYROID STIM HORMONE: CPT

## 2025-07-10 PROCEDURE — 80061 LIPID PANEL: CPT

## 2025-07-10 PROCEDURE — 82306 VITAMIN D 25 HYDROXY: CPT

## 2025-07-10 PROCEDURE — 99999 PR PBB SHADOW E&M-EST. PATIENT-LVL I: CPT | Mod: PBBFAC,,,

## 2025-07-10 PROCEDURE — 93017 CV STRESS TEST TRACING ONLY: CPT

## 2025-07-10 PROCEDURE — 84153 ASSAY OF PSA TOTAL: CPT

## 2025-07-10 PROCEDURE — 82040 ASSAY OF SERUM ALBUMIN: CPT

## 2025-07-10 PROCEDURE — 99999 PR PBB SHADOW E&M-EST. PATIENT-LVL IV: CPT | Mod: PBBFAC,,, | Performed by: INTERNAL MEDICINE

## 2025-07-10 PROCEDURE — 83036 HEMOGLOBIN GLYCOSYLATED A1C: CPT

## 2025-07-10 RX ORDER — ATORVASTATIN CALCIUM 20 MG/1
20 TABLET, FILM COATED ORAL DAILY
Qty: 90 TABLET | Refills: 3 | Status: SHIPPED | OUTPATIENT
Start: 2025-07-10 | End: 2026-07-10

## 2025-07-10 RX ORDER — CLOTRIMAZOLE AND BETAMETHASONE DIPROPIONATE 10; .64 MG/G; MG/G
CREAM TOPICAL 2 TIMES DAILY
Qty: 45 G | Refills: 2 | Status: SHIPPED | OUTPATIENT
Start: 2025-07-10

## 2025-07-10 NOTE — PATIENT INSTRUCTIONS
"Patient Education     Diabetes and diet   The Basics   Written by the doctors and editors at [facility]   Why is diet important if I have diabetes? -- Diet is important because it is part of diabetes treatment. Many people need to change what they eat and how much they eat to help treat their diabetes. It is important for people to treat their diabetes so they:   Keep their blood sugar at goal   Prevent long-term problems, such as heart or kidney problems, that can happen in people with diabetes  Changing your diet can also help treat obesity, high blood pressure, and high cholesterol. These conditions can affect people with diabetes and can lead to future problems, such as heart attacks or strokes.  Who will help me change my diet? -- Your doctor or nurse will work with you to make a food plan to change your diet. They might also recommend that you work with a dietitian. A dietitian is an expert on food and eating.  Do I need to eat at the same times every day? -- When and how often you should eat depends, in part, on the diabetes medicines you take. For example:   People who take about the same amount of insulin at the same time each day (called a "fixed regimen") should eat meals at the same times. This is also true for people who take pills that increase insulin levels, such as sulfonylureas. Eating meals at the same time every day helps prevent low blood sugar.   People who adjust the dose and timing of their insulin each day (called a "flexible regimen") do not always have to eat meals at the same time. That's because they can time their insulin dose for before they plan to eat, and also adjust the dose for how much they plan to eat.   People who take medicines that don't usually cause low blood sugar, such as metformin, don't have to eat meals at the same time every day.  What do I need to think about when planning what to eat? -- Our bodies break down the food we eat into small pieces called carbohydrates, " "proteins, and fats.  When planning what to eat, people with diabetes need to think about:   Carbohydrates (or "carbs") - These are sugars the body uses for energy. They can raise your blood sugar level. Your doctor, nurse, or dietitian will tell you how many carbs you should eat at each meal or snack. Foods that have carbs include:   Bread, pasta, and rice   Vegetables and fruits   Dairy foods   Foods and drinks with added sugar  It is best to get your carbs from fruits, vegetables, whole grains, and low-fat milk. It is best to avoid drinks with added sugar, like soda, juices, and sports drinks.   Protein - Your doctor, nurse, or dietitian will tell you how much protein you should eat each day. It is best to eat lean meats, fish, eggs, beans, peas, soy products, nuts, and seeds. Avoid or limit processed meats like banda, hot dogs, and sausages.   Fats - The type of fat you eat is more important than the amount of fat. "Saturated" and "trans" fats can increase your risk for heart problems, like a heart attack.   Foods that have saturated fats include meat, butter, cheese, and ice cream.   Foods that have trans fats include processed food with "partially hydrogenated oils" on the ingredient list. This might include fried foods, store-bought cookies, muffins, pies, and cakes.  "Monounsaturated" and "polyunsaturated" fats are better for you. Foods with these types of fat include fish, avocado, olive oil, and nuts.   Calories - You need to eat a certain amount of calories each day to keep your weight the same. If you have excess body weight and want to lose weight, you need to eat fewer calories each day.   Fiber - Eating foods with a lot of fiber can help manage your blood sugar level. Foods that have a lot of fiber include apples, green beans, peas, beans, lentils, nuts, oatmeal, and whole grains.   Salt - People who have high blood pressure should not eat foods that contain a lot of salt (also called sodium). People " with high blood pressure should also eat healthy foods, such as fruits, vegetables, and low-fat dairy foods.   Alcohol and sugary drinks - Having more than 1 alcoholic drink (for females) or 2 drinks (for males) a day can raise blood sugar levels. Also, sugary drinks like fruit juice or soda can raise blood sugar levels.  How can I lose weight? -- You can:   Exercise - Try to get at least 30 minutes of physical activity a day, most days of the week. Even gentle exercise, like walking, is good for your health. Some people with diabetes need to change their medicine dose before they exercise. They might also need to check their blood sugar levels before and after exercising.   Eat fewer calories - Your doctor, nurse, or dietitian can tell you how many calories you should eat each day to lose weight.  If you are worried about your weight, size, or shape, talk with your doctor, nurse, or dietitian. They can help you make changes to improve your health.  Can I eat the same foods as my family? -- Yes. You do not need to eat special foods if you have diabetes. You and your family can eat the same foods. Changing your diet is mostly about eating healthy foods in healthy amounts.  What are the other parts of diabetes treatment? -- Besides changing your diet, the other parts of diabetes treatment are:   Exercise   Medicines  Some people with diabetes need to learn how to match their diet and exercise with their medicine dose. For example, people who use insulin might need to choose the dose of insulin they give themselves. To choose their dose, they need to think about:   What they plan to eat at the next meal   How much exercise they plan to do   What their blood sugar level is  If the diet and exercise do not match the medicine dose, a person's blood sugar level can get too low or too high. Blood sugar levels that are too low or too high can cause problems.  All topics are updated as new evidence becomes available and our  peer review process is complete.  This topic retrieved from [employer] computer on: May 30, 2024.  Topic 44416 Version 13.0  Release: 32.5.3 - C32.150  © 2024 UpToDate, Inc. and/or its affiliates. All rights reserved.  Consumer Information Use and Disclaimer   Disclaimer: This generalized information is a limited summary of diagnosis, treatment, and/or medication information. It is not meant to be comprehensive and should be used as a tool to help the user understand and/or assess potential diagnostic and treatment options. It does NOT include all information about conditions, treatments, medications, side effects, or risks that may apply to a specific patient. It is not intended to be medical advice or a substitute for the medical advice, diagnosis, or treatment of a health care provider based on the health care provider's examination and assessment of a patient's specific and unique circumstances. Patients must speak with a health care provider for complete information about their health, medical questions, and treatment options, including any risks or benefits regarding use of medications. This information does not endorse any treatments or medications as safe, effective, or approved for treating a specific patient. UpToDate, Inc. and its affiliates disclaim any warranty or liability relating to this information or the use thereof.The use of this information is governed by the Terms of Use, available at https://www.woltersBM[employer] Controlsuwer.com/en/know/clinical-effectiveness-terms. 2024© UpToDate, Inc. and its affiliates and/or licensors. All rights reserved.  Copyright   © 2024 UpToDate, Inc. and/or its affiliates. All rights reserved.    Patient Education       Diabetes Exchange Diet   About this topic   This diet plan will help you control your blood sugar level. This may also help you have balanced nutrients in your meal. Foods are divided into groups. The groups include starch, fruit, milk, vegetables, meat and meat substitutes,  "and fat. A serving of food in a group has about the same nutrition as another food in that group. That means that each of the foods have about the same amount of carbohydrates (carbs), protein, fat, and calories as the other. These foods can be exchanged for each other without changing your blood sugar.       What will the results be?   You can manage your blood sugar level if you follow this diet. Ask your doctor if you should be checking your blood sugar. Write down your readings and the foods you eat.  What foods are good to eat?   This list is based on the Academy of Nutrition and Dietetics and American Diabetes Association food exchange list.  Starch or bread: One starch exchange has 15 grams of carbs and 80 calories per serving. One serving is equal to:  1 slice of bread (white, whole wheat, rye)  2 slices of reduced calorie or "lite" bread  1/4 (1 ounce/30 grams) bagel  1/2 English muffin  1/2 hamburger bun  3/4 cup (180 grams) cold cereal  1/3 cup (80 grams) cooked brown or white rice  1/2 cup (120 grams) cooked legumes, like dried beans, peas, or lentils  1/3 cup (80 grams) cooked pasta  1/2 cup (120 grams) cup bulgur  1/2 cup (120 grams) corn, sweet potato, or peas  3 ounces (180 grams) baked sweet or white potato  3/4 ounce (24 grams) pretzels  3 cups (720 grams) microwave or hot air popped popcorn (80% light)  Meat and meat substitutes:   Lean protein: This contains 45 calories, 7 grams of protein, and 2 grams of fat per serving. One serving is equal to:  1 ounce (30 grams) poultry, skin removed; chicken, Holden hen, turkey, lean ground turkey or chicken  1 ounce (30 grams) lean beef (flank steak, tenderloin, ground beef with 90% or higher lean/10% or lower fat)*  1 ounce (30 grams) veal, roast or lean chop*  1 ounce (30 grams) lamb, roast or lean chop*  1 ounce (30 grams) pork tenderloin or fresh ham*  1 ounce (30 grams) low-fat cheese (3 grams or less of fat per oz)  1 ounce (30 grams) low-fat " luncheon meats (3 grams or less of fat per oz)  1/4 cup cottage cheese  1 ounce (30 grams) salmon, catfish, cod, flounder, tilapia, herring  1 ounce (30 grams) tuna in water, canned  2 small sardines  1 ounce (30 grams) shellfish (clams, scallop, lobster, shrimp)  2 egg whites  1/2 cup (60 grams) egg substitute  1/2 cup (120 grams) cooked beans (black beans, kidney, chickpeas, or lentils)  *Limit to 1 to 2 times per week.  Medium-fat protein: This contains 75 calories, 7 grams of protein, and 5 grams of fat per serving. One serving is equal to:  1 ounce (30 grams) beef (any prime cut), corned beef, ground beef (85% or lower lean/15% or higher fat)**  1 ounce (30 grams) pork chop; cutlet, ground, or shoulder roast  1 ounce (30 grams) poultry with skin  1 ounce (30 grams) fish; any fried  1 whole egg (medium)**  1 ounce (30 grams) mozzarella cheese  1/4 cup (60 grams) ricotta cheese  4 ounces (120 grams) tofu (note that this is a heart healthy choice)  **Choose these rarely.  Non-starchy Vegetables: One vegetable exchange has 25 calories, 2 grams of protein, and 5 grams of carbs per serving. One serving is equal to:  1/2 cup (120 grams) cooked vegetables (carrots, broccoli, zucchini, cabbage, asparagus)  1 cup (240 grams) raw vegetables or salad greens  1/2 cup (120 mL) vegetable juice  Fruits: One fruit exchange has 15 grams of carbs and 60 calories. One serving is equal to:  1 small apple  1 medium orange or nectarine  1 extra-small banana  1 medium fresh peach  1/2 cup sliced kiwi  1/2 grapefruit  1/2 jayme  1 cup (240 grams) fresh berries (blackberries or raspberries)  1 cup (240 grams) fresh melon (honeydew or cantaloupe) cubes  4 ounces (120 grams) unsweetened juice  Fat-free and low-fat milk: One fat-free or low-fat milk exchange has 100 calories, 12 grams of carbs, and 8 grams of protein per serving. One serving is equal to:  1 cup (240 mL) milk, fat-free or 1% fat  2/3 cup (160 mL) yogurt, plain or  artificially sweetened nonfat or low-fat  Fats: One fat exchange has 45 calories and 5 grams of fat per serving. One serving is equal to:  1 teaspoon (5 mL) oil (vegetable, corn, canola, olive)  1 teaspoon (5 grams) butter  1 teaspoon (5 grams) stick margarine  1 teaspoon (5 grams) mayonnaise  1 tablespoon (15 grams) reduced-fat margarine or mayonnaise  1 tablespoon (15 mL) salad dressing  1 tablespoon (15 grams) cream cheese  1 1/2 tablespoons (22.5 grams) reduced-fat cream cheese  1/8 avocado  8 large black olives  10 large stuffed green olives  1 slice banda  Will there be any other care needed?   Your doctor may ask you to make visits to the office to check on your progress. Be sure to keep these visits. If you have been writing down your blood sugar readings, bring it to your visit.  When do I need to call the doctor?   Talk to your doctor or dietitian. They can help you if your favorite food is not on this list. They will also be able to help you with food allergies. Ask them to help you make your own food plan.  Last Reviewed Date   2021-03-29  Consumer Information Use and Disclaimer   This information is not specific medical advice and does not replace information you receive from your health care provider. This is only a brief summary of general information. It does NOT include all information about conditions, illnesses, injuries, tests, procedures, treatments, therapies, discharge instructions or life-style choices that may apply to you. You must talk with your health care provider for complete information about your health and treatment options. This information should not be used to decide whether or not to accept your health care providers advice, instructions or recommendations. Only your health care provider has the knowledge and training to provide advice that is right for you.  Copyright   Copyright © 2021 UpToDate, Inc. and its affiliates and/or licensors. All rights reserved.

## 2025-07-10 NOTE — LETTER
July 10, 2025    Porter Rice  [address] [address]             [facility]  [address]  [facility] [address]  Phone: [phone]  Fax: [phone] Dear Mr. Rice:        Thank you for allowing me to serve you and perform your Executive Health exam on 7/10/2025.  This letter will serve a brief summary of the history, physical findings, and laboratory/studies performed and recommendations at that time.    Reason for Visit: Executive Health Preventive Physical Examination    Subjective:       Patient ID: Porter Rice is a 60 y.o. male.    Chief Complaint: Executive Health    HPI:  Patient seen for executive health physical.  He had CT lung screening, stress test and labs.  General doing well.  His mom passed away a few weeks ago but he said she had a good life.  He actually will have a little more free time because he was spending a lot of time caring for her.  He needed a skin cream prescription updated so I will update that.    Diet has not been as good lately and he has gained some weight.  We reviewed his studies including stress test which was negative for ischemia.    PSA was stable, blood count stable, chemistry stable except for sugar and ALT.  Lipids are significantly elevated and A1c was significantly elevated.  He now has diabetes with an A1c above 7 and his significantly elevated lipids raised his 10 year risk score.  He will go on a diabetic diet which we will recheck labs in 3 months and he will start a statin.  Benefits risks and side effects discussed        The 10-year ASCVD risk score (Kitty VOGT, et al., 2019) is: 21.4%    Values used to calculate the score:      Age: 60 years      Sex: Male      Is Non- : No      Diabetic: Yes      Tobacco smoker: No      Systolic Blood Pressure: 122 mmHg      Is BP treated: No      HDL Cholesterol: 45 mg/dL      Total Cholesterol: 263 mg/dL       Review of Systems    Constitutional:  Negative for chills, fatigue and fever.   HENT:  Negative for nosebleeds and trouble swallowing.    Eyes:  Negative for pain and visual disturbance.   Respiratory:  Negative for cough, shortness of breath and wheezing.    Cardiovascular:  Negative for chest pain and palpitations.   Gastrointestinal:  Negative for abdominal pain, constipation, diarrhea, nausea and vomiting.   Genitourinary:  Negative for difficulty urinating and hematuria.   Musculoskeletal:  Negative for arthralgias, back pain and neck pain.   Integumentary:  Negative for rash.   Neurological:  Negative for dizziness and headaches.   Hematological:  Does not bruise/bleed easily.   Psychiatric/Behavioral:  Negative for dysphoric mood and sleep disturbance.            Past Medical History:   Diagnosis Date    Colon polyps      Past Surgical History:   Procedure Laterality Date    COLONOSCOPY N/A 5/24/2017    Procedure: COLONOSCOPY;  Surgeon: Adan Scott MD;  Location: [facility] ([address] [place] [address]);  Service: Endoscopy;  Laterality: N/A;  please schedule as VERY FIRST CASE OF THE DAY with me    COLONOSCOPY N/A 11/8/2021    Procedure: COLONOSCOPY;  Surgeon: Adan Scott MD;  Location: [address]);  Service: Endoscopy;  Laterality: N/A;  last colonoscopy on 5/24/17-repeat in 3 yrs per Dr Scott. order placed      C[facility] at [facility] on 11/5-GT      Problem List[1]     Objective:      Physical Exam  Constitutional:       General: He is not in acute distress.     Appearance: He is well-developed. He is obese.   HENT:      Head: Normocephalic and atraumatic.      Right Ear: Tympanic membrane, ear canal and external ear normal.      Left Ear: Tympanic membrane, ear canal and external ear normal.      Mouth/Throat:      Pharynx: No oropharyngeal exudate or posterior oropharyngeal erythema.   Eyes:      General: No scleral icterus.     Conjunctiva/sclera: Conjunctivae normal.      Pupils: Pupils are equal, round, and  reactive to light.   Neck:      Thyroid: No thyromegaly.      Comments: No supraclavicular nodes palpated  Cardiovascular:      Rate and Rhythm: Normal rate and regular rhythm.      Pulses: Normal pulses.      Heart sounds: Normal heart sounds. No murmur heard.  Pulmonary:      Effort: Pulmonary effort is normal.      Breath sounds: Normal breath sounds. No wheezing.   Abdominal:      General: Bowel sounds are normal.      Palpations: Abdomen is soft. There is no mass.      Tenderness: There is no abdominal tenderness.   Musculoskeletal:         General: No tenderness.      Cervical back: Normal range of motion and neck supple.      Right lower leg: No edema.      Left lower leg: No edema.   Lymphadenopathy:      Cervical: No cervical adenopathy.   Skin:     Coloration: Skin is not jaundiced or pale.   Neurological:      General: No focal deficit present.      Mental Status: He is alert and oriented to person, place, and time.   Psychiatric:         Mood and Affect: Mood normal.         Behavior: Behavior normal.         Assessment:       Problem List Items Addressed This Visit          Cardiac/Vascular    Hyperlipidemia    Relevant Medications    atorvastatin (LIPITOR) 20 MG tablet    Other Relevant Orders    Hemoglobin A1C    Comprehensive Metabolic Panel    Lipid Panel       Endocrine    Vitamin D deficiency     Other Visit Diagnoses         Routine physical examination    -  Primary      Tinea cruris        Relevant Medications    clotrimazole-betamethasone 1-0.05% (LOTRISONE) cream      Type 2 diabetes mellitus with hyperglycemia, without long-term current use of insulin        Relevant Orders    Hemoglobin A1C    Lipid Panel            Plan:         Porter was seen today for [facility].    Diagnoses and all orders for this visit:    Routine physical examination    Tinea cruris  -     clotrimazole-betamethasone 1-0.05% (LOTRISONE) cream; Apply topically 2 (two) times daily.    Hyperlipidemia, unspecified  "hyperlipidemia type  -     Hemoglobin A1C; Future  -     atorvastatin (LIPITOR) 20 MG tablet; Take 1 tablet (20 mg total) by mouth once daily.  -     Comprehensive Metabolic Panel; Future  -     Lipid Panel; Future    Type 2 diabetes mellitus with hyperglycemia, without long-term current use of insulin  -     Hemoglobin A1C; Future  -     Lipid Panel; Future  Diabetic diet information given.  Follow-up in 3 months  Vitamin D deficiency  Consider restarting supplementation    Wait on results from CT screening.                   Portions of this note may have been created with voice recognition software. Occasional "wrong-word" or "sound-a-like" substitutions may have occurred due to the inherent limitations of voice recognition software. Please, read the note carefully and recognize, using context, where substitutions have occurred.           Labs:    Component      Latest Ref Rng 7/10/2025   Sodium      136 - 145 mmol/L 144    Potassium      3.5 - 5.1 mmol/L 5.0    Chloride      95 - 110 mmol/L 108    CO2      23 - 29 mmol/L 26    Glucose      70 - 110 mg/dL 160 (H)    BUN      8 - 23 mg/dL 15    Creatinine      0.5 - 1.4 mg/dL 0.8    Calcium      8.7 - 10.5 mg/dL 9.7    PROTEIN TOTAL      6.0 - 8.4 gm/dL 7.4    Albumin      3.5 - 5.2 g/dL 4.2    BILIRUBIN TOTAL      0.1 - 1.0 mg/dL 0.6    ALP      40 - 150 unit/L 64    AST      11 - 45 unit/L 45    ALT      10 - 44 unit/L 56 (H)    Anion Gap      8 - 16 mmol/L 10    eGFR      >60 mL/min/1.73/m2 >60    WBC      3.90 - 12.70 K/uL 5.62    RBC      4.60 - 6.20 M/uL 5.25    Hemoglobin      14.0 - 18.0 gm/dL 16.1    Hematocrit      40.0 - 54.0 % 48.4    MCV      82 - 98 fL 92    MCHC      32.0 - 36.0 g/dL 33.3    RDW      11.5 - 14.5 % 12.2    Platelet Count      150 - 450 K/uL 168    MCH      27.0 - 31.0 pg 30.7    MPV      9.2 - 12.9 fL 10.8    Cholesterol Total      120 - 199 mg/dL 263 (H)    Triglycerides      30 - 150 mg/dL 160 (H)    HDL      40 - 75 mg/dL 45  "   LDL Cholesterol      63.0 - 159.0 mg/dL 186.0 (H)    HDL/Cholesterol Ratio      20.0 - 50.0 % 17.1 (L)    Total Cholesterol/HDL Ratio      2.0 - 5.0  5.8 (H)    Non-HDL Cholesterol      mg/dL 218    Hemoglobin A1C External      4.0 - 5.6 % 7.2 (H)    Estimated Avg Glucose      68 - 131 mg/dL 160 (H)    TSH      0.400 - 4.000 uIU/mL 1.753    Prostate Specific Antigen      <=4.00 ng/mL 0.80    Vitamin D      30 - 96 ng/mL 20 (L)       Legend:  (H) High  (L) Low  Results for orders placed or performed during the hospital encounter of 07/10/25   Exercise Stress - EKG    Collection Time: 07/10/25  9:26 AM   Result Value Ref Range    85% Max Predicted      Max Predicted      [facility] CV CPX PATIENT IS MALE 1.0     [facility] CV CPX PATIENT IS FEMALE 0.0     HR at rest 56 bpm    Systolic blood pressure 138 mmHg    Diastolic blood pressure 62 mmHg    RPP 7,728     Exercise duration (min) 7 minutes    Exercise duration (sec) 14 seconds    Peak  bpm    Peak Systolic  mmHg    Peak Diatolic BP 59 mmHg    Peak RPP 28,800     Estimated METs 12     % Max HR Achieved 94     1 Minute Recovery  bpm      Review chest CT screening when available  Assessment/Recommendations:  Routine Health Maintenance    At this time, you appear to be in good medical condition.  I look forward to seeing you again next year.  Please contact me should you have any questions or concerns regarding physical findings, or my recommendations.              If you have any questions or concerns, please don't hesitate to call.    Sincerely,        Zeeshan Ramos MD          [1]  Patient Active Problem List  Diagnosis    Encounter for screening colonoscopy    Villous adenoma of colon    Family history of colon cancer in father    Vitamin D deficiency    Lung nodule < 6cm on CT    Elevated glucose level    Hyperlipidemia

## 2025-07-10 NOTE — PROGRESS NOTES
Subjective:       Patient ID: Porter Rice is a 60 y.o. male.    Chief Complaint: Executive Health    HPI:  Patient seen for executive health physical.  He had CT lung screening, stress test and labs.  General doing well.  His mom passed away a few weeks ago but he said she had a good life.  He actually will have a little more free time because he was spending a lot of time caring for her.  He needed a skin cream prescription updated so I will update that.    Diet has not been as good lately and he has gained some weight.  We reviewed his studies including stress test which was negative for ischemia.    PSA was stable, blood count stable, chemistry stable except for sugar and ALT.  Lipids are significantly elevated and A1c was significantly elevated.  He now has diabetes with an A1c above 7 and his significantly elevated lipids raised his 10 year risk score.  He will go on a diabetic diet which we will recheck labs in 3 months and he will start a statin.  Benefits risks and side effects discussed        The 10-year ASCVD risk score (Kitty VOGT, et al., 2019) is: 21.4%    Values used to calculate the score:      Age: 60 years      Sex: Male      Is Non- : No      Diabetic: Yes      Tobacco smoker: No      Systolic Blood Pressure: 122 mmHg      Is BP treated: No      HDL Cholesterol: 45 mg/dL      Total Cholesterol: 263 mg/dL       Review of Systems   Constitutional:  Negative for chills, fatigue and fever.   HENT:  Negative for nosebleeds and trouble swallowing.    Eyes:  Negative for pain and visual disturbance.   Respiratory:  Negative for cough, shortness of breath and wheezing.    Cardiovascular:  Negative for chest pain and palpitations.   Gastrointestinal:  Negative for abdominal pain, constipation, diarrhea, nausea and vomiting.   Genitourinary:  Negative for difficulty urinating and hematuria.   Musculoskeletal:  Negative for arthralgias, back pain and neck pain.   Integumentary:   Negative for rash.   Neurological:  Negative for dizziness and headaches.   Hematological:  Does not bruise/bleed easily.   Psychiatric/Behavioral:  Negative for dysphoric mood and sleep disturbance.            Past Medical History:   Diagnosis Date    Colon polyps      Past Surgical History:   Procedure Laterality Date    COLONOSCOPY N/A 5/24/2017    Procedure: COLONOSCOPY;  Surgeon: Adan Scott MD;  Location: [facility] ([address] [place] [address]);  Service: Endoscopy;  Laterality: N/A;  please schedule as VERY FIRST CASE OF THE DAY with me    COLONOSCOPY N/A 11/8/2021    Procedure: COLONOSCOPY;  Surgeon: Adan Scott MD;  Location: [facility] ([address] [place] [address]);  Service: Endoscopy;  Laterality: N/A;  last colonoscopy on 5/24/17-repeat in 3 yrs per Dr Scott. order placed      COVID test at [facility] on 11/5-GT      Problem List[1]     Objective:      Physical Exam  Constitutional:       General: He is not in acute distress.     Appearance: He is well-developed. He is obese.   HENT:      Head: Normocephalic and atraumatic.      Right Ear: Tympanic membrane, ear canal and external ear normal.      Left Ear: Tympanic membrane, ear canal and external ear normal.      Mouth/Throat:      Pharynx: No oropharyngeal exudate or posterior oropharyngeal erythema.   Eyes:      General: No scleral icterus.     Conjunctiva/sclera: Conjunctivae normal.      Pupils: Pupils are equal, round, and reactive to light.   Neck:      Thyroid: No thyromegaly.      Comments: No supraclavicular nodes palpated  Cardiovascular:      Rate and Rhythm: Normal rate and regular rhythm.      Pulses: Normal pulses.      Heart sounds: Normal heart sounds. No murmur heard.  Pulmonary:      Effort: Pulmonary effort is normal.      Breath sounds: Normal breath sounds. No wheezing.   Abdominal:      General: Bowel sounds are normal.      Palpations: Abdomen is soft. There is no mass.      Tenderness: There is no abdominal tenderness.   Musculoskeletal:          General: No tenderness.      Cervical back: Normal range of motion and neck supple.      Right lower leg: No edema.      Left lower leg: No edema.   Lymphadenopathy:      Cervical: No cervical adenopathy.   Skin:     Coloration: Skin is not jaundiced or pale.   Neurological:      General: No focal deficit present.      Mental Status: He is alert and oriented to person, place, and time.   Psychiatric:         Mood and Affect: Mood normal.         Behavior: Behavior normal.         Assessment:       Problem List Items Addressed This Visit          Cardiac/Vascular    Hyperlipidemia    Relevant Medications    atorvastatin (LIPITOR) 20 MG tablet    Other Relevant Orders    Hemoglobin A1C    Comprehensive Metabolic Panel    Lipid Panel       Endocrine    Vitamin D deficiency     Other Visit Diagnoses         Routine physical examination    -  Primary      Tinea cruris        Relevant Medications    clotrimazole-betamethasone 1-0.05% (LOTRISONE) cream      Type 2 diabetes mellitus with hyperglycemia, without long-term current use of insulin        Relevant Orders    Hemoglobin A1C    Lipid Panel            Plan:         Porter was seen today for [employer].    Diagnoses and all orders for this visit:    Routine physical examination    Tinea cruris  -     clotrimazole-betamethasone 1-0.05% (LOTRISONE) cream; Apply topically 2 (two) times daily.    Hyperlipidemia, unspecified hyperlipidemia type  -     Hemoglobin A1C; Future  -     atorvastatin (LIPITOR) 20 MG tablet; Take 1 tablet (20 mg total) by mouth once daily.  -     Comprehensive Metabolic Panel; Future  -     Lipid Panel; Future    Type 2 diabetes mellitus with hyperglycemia, without long-term current use of insulin  -     Hemoglobin A1C; Future  -     Lipid Panel; Future  Diabetic diet information given.  Follow-up in 3 months  Vitamin D deficiency  Consider restarting supplementation    Wait on results from CT screening.                   Portions of this  "note may have been created with voice recognition software. Occasional "wrong-word" or "sound-a-like" substitutions may have occurred due to the inherent limitations of voice recognition software. Please, read the note carefully and recognize, using context, where substitutions have occurred.         [1]   Patient Active Problem List  Diagnosis    Encounter for screening colonoscopy    Villous adenoma of colon    Family history of colon cancer in father    Vitamin D deficiency    Lung nodule < 6cm on CT    Elevated glucose level    Hyperlipidemia     "

## 2025-07-16 ENCOUNTER — PATIENT MESSAGE (OUTPATIENT)
Dept: INTERNAL MEDICINE | Facility: CLINIC | Age: 60
End: 2025-07-16
Payer: COMMERCIAL

## 2025-07-16 DIAGNOSIS — N28.89 LEFT RENAL MASS: Primary | ICD-10-CM

## 2025-07-16 DIAGNOSIS — Z72.0 TOBACCO USE: ICD-10-CM

## 2025-07-17 ENCOUNTER — HOSPITAL ENCOUNTER (OUTPATIENT)
Dept: RADIOLOGY | Facility: HOSPITAL | Age: 60
Discharge: HOME | End: 2025-07-17
Attending: INTERNAL MEDICINE
Payer: COMMERCIAL

## 2025-07-17 DIAGNOSIS — N28.89 LEFT RENAL MASS: ICD-10-CM

## 2025-07-17 DIAGNOSIS — E11.9 TYPE 2 DIABETES MELLITUS WITHOUT COMPLICATION: ICD-10-CM

## 2025-07-17 DIAGNOSIS — E11.9 TYPE 2 DIABETES MELLITUS WITHOUT COMPLICATION, UNSPECIFIED WHETHER LONG TERM INSULIN USE: ICD-10-CM

## 2025-07-17 PROCEDURE — 76770 US EXAM ABDO BACK WALL COMP: CPT | Mod: TC

## 2025-07-17 PROCEDURE — 76770 US EXAM ABDO BACK WALL COMP: CPT | Mod: 26,,, | Performed by: RADIOLOGY

## 2025-07-25 ENCOUNTER — PATIENT MESSAGE (OUTPATIENT)
Dept: INTERNAL MEDICINE | Facility: CLINIC | Age: 60
End: 2025-07-25
Payer: COMMERCIAL